# Patient Record
Sex: FEMALE | Race: WHITE | NOT HISPANIC OR LATINO | Employment: UNEMPLOYED | URBAN - NONMETROPOLITAN AREA
[De-identification: names, ages, dates, MRNs, and addresses within clinical notes are randomized per-mention and may not be internally consistent; named-entity substitution may affect disease eponyms.]

---

## 2017-06-09 ENCOUNTER — APPOINTMENT (OUTPATIENT)
Dept: GENERAL RADIOLOGY | Facility: HOSPITAL | Age: 77
End: 2017-06-09

## 2017-06-09 ENCOUNTER — HOSPITAL ENCOUNTER (EMERGENCY)
Facility: HOSPITAL | Age: 77
Discharge: HOME | End: 2017-06-09
Attending: EMERGENCY MEDICINE | Admitting: EMERGENCY MEDICINE

## 2017-06-09 ENCOUNTER — APPOINTMENT (OUTPATIENT)
Dept: CT IMAGING | Facility: HOSPITAL | Age: 77
End: 2017-06-09

## 2017-06-09 VITALS
OXYGEN SATURATION: 100 % | BODY MASS INDEX: 27.42 KG/M2 | WEIGHT: 149 LBS | DIASTOLIC BLOOD PRESSURE: 62 MMHG | HEART RATE: 62 BPM | TEMPERATURE: 97.8 F | HEIGHT: 62 IN | RESPIRATION RATE: 18 BRPM | SYSTOLIC BLOOD PRESSURE: 142 MMHG

## 2017-06-09 DIAGNOSIS — R55: ICD-10-CM
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
## 2017-06-09 DIAGNOSIS — N39.0 ACUTE UTI: ICD-10-CM

## 2017-06-09 LAB
ALBUMIN SERPL-MCNC: 3.8 G/DL (ref 3.4–4.8)
ALBUMIN/GLOB SERPL: 1.3 G/DL (ref 1.1–1.8)
ALP SERPL-CCNC: 77 U/L (ref 38–126)
ALT SERPL W P-5'-P-CCNC: 31 U/L (ref 9–52)
ANION GAP SERPL CALCULATED.3IONS-SCNC: 11 MMOL/L (ref 5–15)
AST SERPL-CCNC: 25 U/L (ref 14–36)
BACTERIA UR QL AUTO: ABNORMAL /HPF
BASOPHILS # BLD AUTO: 0.02 10*3/MM3 (ref 0–0.2)
BASOPHILS NFR BLD AUTO: 0.2 % (ref 0–2)
BILIRUB SERPL-MCNC: 0.4 MG/DL (ref 0.2–1.3)
BILIRUB UR QL STRIP: NEGATIVE
BUN BLD-MCNC: 21 MG/DL (ref 7–21)
BUN/CREAT SERPL: 21 (ref 7–25)
CALCIUM SPEC-SCNC: 8.8 MG/DL (ref 8.4–10.2)
CHLORIDE SERPL-SCNC: 106 MMOL/L (ref 95–110)
CLARITY UR: ABNORMAL
CO2 SERPL-SCNC: 23 MMOL/L (ref 22–31)
COLOR UR: YELLOW
CREAT BLD-MCNC: 1 MG/DL (ref 0.5–1)
DEPRECATED RDW RBC AUTO: 44.4 FL (ref 36.4–46.3)
EOSINOPHIL # BLD AUTO: 0.08 10*3/MM3 (ref 0–0.7)
EOSINOPHIL NFR BLD AUTO: 0.9 % (ref 0–7)
ERYTHROCYTE [DISTWIDTH] IN BLOOD BY AUTOMATED COUNT: 14.3 % (ref 11.5–14.5)
GFR SERPL CREATININE-BSD FRML MDRD: 53 ML/MIN/1.73 (ref 39–90)
GLOBULIN UR ELPH-MCNC: 2.9 GM/DL (ref 2.3–3.5)
GLUCOSE BLD-MCNC: 86 MG/DL (ref 60–100)
GLUCOSE BLDC GLUCOMTR-MCNC: 81 MG/DL (ref 70–130)
GLUCOSE UR STRIP-MCNC: NEGATIVE MG/DL
HCT VFR BLD AUTO: 36.5 % (ref 35–45)
HGB BLD-MCNC: 12.1 G/DL (ref 12–15.5)
HGB UR QL STRIP.AUTO: ABNORMAL
HOLD SPECIMEN: NORMAL
HOLD SPECIMEN: NORMAL
HYALINE CASTS UR QL AUTO: ABNORMAL /LPF
IMM GRANULOCYTES # BLD: 0.03 10*3/MM3 (ref 0–0.02)
IMM GRANULOCYTES NFR BLD: 0.3 % (ref 0–0.5)
KETONES UR QL STRIP: ABNORMAL
LEUKOCYTE ESTERASE UR QL STRIP.AUTO: ABNORMAL
LYMPHOCYTES # BLD AUTO: 2.86 10*3/MM3 (ref 0.6–4.2)
LYMPHOCYTES NFR BLD AUTO: 30.7 % (ref 10–50)
MAGNESIUM SERPL-MCNC: 2 MG/DL (ref 1.6–2.3)
MCH RBC QN AUTO: 28.4 PG (ref 26.5–34)
MCHC RBC AUTO-ENTMCNC: 33.2 G/DL (ref 31.4–36)
MCV RBC AUTO: 85.7 FL (ref 80–98)
MONOCYTES # BLD AUTO: 0.66 10*3/MM3 (ref 0–0.9)
MONOCYTES NFR BLD AUTO: 7.1 % (ref 0–12)
NEUTROPHILS # BLD AUTO: 5.66 10*3/MM3 (ref 2–8.6)
NEUTROPHILS NFR BLD AUTO: 60.8 % (ref 37–80)
NITRITE UR QL STRIP: POSITIVE
PH UR STRIP.AUTO: 6 [PH] (ref 5–9)
PLATELET # BLD AUTO: 312 10*3/MM3 (ref 150–450)
PMV BLD AUTO: 9.4 FL (ref 8–12)
POTASSIUM BLD-SCNC: 4.4 MMOL/L (ref 3.5–5.1)
PROT SERPL-MCNC: 6.7 G/DL (ref 6.3–8.6)
PROT UR QL STRIP: NEGATIVE
RBC # BLD AUTO: 4.26 10*6/MM3 (ref 3.77–5.16)
RBC # UR: ABNORMAL /HPF
REF LAB TEST METHOD: ABNORMAL
SODIUM BLD-SCNC: 140 MMOL/L (ref 137–145)
SP GR UR STRIP: 1.02 (ref 1–1.03)
SQUAMOUS #/AREA URNS HPF: ABNORMAL /HPF
TROPONIN I SERPL-MCNC: <0.012 NG/ML
UROBILINOGEN UR QL STRIP: ABNORMAL
WBC NRBC COR # BLD: 9.31 10*3/MM3 (ref 3.2–9.8)
WBC UR QL AUTO: ABNORMAL /HPF
WHOLE BLOOD HOLD SPECIMEN: NORMAL
WHOLE BLOOD HOLD SPECIMEN: NORMAL

## 2017-06-09 PROCEDURE — 70450 CT HEAD/BRAIN W/O DYE: CPT

## 2017-06-09 PROCEDURE — 87077 CULTURE AEROBIC IDENTIFY: CPT | Performed by: EMERGENCY MEDICINE

## 2017-06-09 PROCEDURE — 84484 ASSAY OF TROPONIN QUANT: CPT | Performed by: EMERGENCY MEDICINE

## 2017-06-09 PROCEDURE — 99284 EMERGENCY DEPT VISIT MOD MDM: CPT

## 2017-06-09 PROCEDURE — 73590 X-RAY EXAM OF LOWER LEG: CPT

## 2017-06-09 PROCEDURE — 83735 ASSAY OF MAGNESIUM: CPT | Performed by: EMERGENCY MEDICINE

## 2017-06-09 PROCEDURE — 93010 ELECTROCARDIOGRAM REPORT: CPT | Performed by: INTERNAL MEDICINE

## 2017-06-09 PROCEDURE — 87086 URINE CULTURE/COLONY COUNT: CPT | Performed by: EMERGENCY MEDICINE

## 2017-06-09 PROCEDURE — 82962 GLUCOSE BLOOD TEST: CPT

## 2017-06-09 PROCEDURE — 81001 URINALYSIS AUTO W/SCOPE: CPT | Performed by: EMERGENCY MEDICINE

## 2017-06-09 PROCEDURE — 93005 ELECTROCARDIOGRAM TRACING: CPT

## 2017-06-09 PROCEDURE — 25010000002 CEFTRIAXONE: Performed by: EMERGENCY MEDICINE

## 2017-06-09 PROCEDURE — 96365 THER/PROPH/DIAG IV INF INIT: CPT

## 2017-06-09 PROCEDURE — 73130 X-RAY EXAM OF HAND: CPT

## 2017-06-09 PROCEDURE — 80053 COMPREHEN METABOLIC PANEL: CPT | Performed by: EMERGENCY MEDICINE

## 2017-06-09 PROCEDURE — 85025 COMPLETE CBC W/AUTO DIFF WBC: CPT | Performed by: EMERGENCY MEDICINE

## 2017-06-09 PROCEDURE — 96361 HYDRATE IV INFUSION ADD-ON: CPT

## 2017-06-09 PROCEDURE — 71010 HC CHEST PA OR AP: CPT

## 2017-06-09 PROCEDURE — 87186 SC STD MICRODIL/AGAR DIL: CPT | Performed by: EMERGENCY MEDICINE

## 2017-06-09 RX ORDER — SODIUM CHLORIDE 0.9 % (FLUSH) 0.9 %
10 SYRINGE (ML) INJECTION AS NEEDED
Status: DISCONTINUED | OUTPATIENT
Start: 2017-06-09 | End: 2017-06-09 | Stop reason: HOSPADM

## 2017-06-09 RX ORDER — CEPHALEXIN 500 MG/1
500 CAPSULE ORAL 2 TIMES DAILY
Qty: 10 CAPSULE | Refills: 0 | Status: SHIPPED | OUTPATIENT
Start: 2017-06-09 | End: 2019-11-14

## 2017-06-09 RX ADMIN — CEFTRIAXONE 1 G: 1 INJECTION, POWDER, FOR SOLUTION INTRAMUSCULAR; INTRAVENOUS at 17:13

## 2017-06-09 RX ADMIN — SODIUM CHLORIDE 1000 ML: 900 INJECTION, SOLUTION INTRAVENOUS at 15:30

## 2017-06-09 NOTE — ED PROVIDER NOTES
Subjective   HPI Comments: 77 years old female with no active medical problems presented in the ER for evaluation of fall and transient syncopal episode.  Apparently she missed a step and fell on the sidewalk on her right hand, did not hit her head, loss consciousness for a few seconds and improved to normal.  She has no residual deficit.  No seizure-like activity.  She had no chest pain, palpitation, dizziness/lightheadedness or shortness of breath before or after the fall.  She is complaining of pain in the right hand and right shin.    Patient is a 77 y.o. female presenting with fall.   History provided by:  Patient  Fall   Mechanism of injury comment:  Missed a step because it was a bit higher than she thought and fell on the side walk.   Injury location:  Hand and finger  Hand injury location:  R palm  Finger injury location:  R ring finger and R long finger  Incident location:  [place]  Time since incident:  2 hours  Arrived directly from scene: yes    Suspicion of drug use: no    Tetanus status:  Up to date  Prior to arrival data:     Bystander interventions:  None    Patient ambulatory at scene: yes      Blood loss:  None    Responsiveness at scene:  Alert    Orientation at scene:  Person, place, situation and time    Amnesic to event: no      Airway interventions:  None    Breathing interventions:  None  Associated symptoms: loss of consciousness    Associated symptoms: no abdominal pain, no back pain, no blindness, no chest pain, no difficulty breathing, no headaches, no hearing loss, no nausea, no neck pain, no seizures and no vomiting        Review of Systems   Constitutional: Negative for chills and fever.   HENT: Negative for congestion, facial swelling, hearing loss, rhinorrhea and sinus pressure.    Eyes: Negative for blindness, photophobia and visual disturbance.   Respiratory: Negative for cough, chest tightness, shortness of breath and wheezing.    Cardiovascular: Negative for chest pain.    Gastrointestinal: Negative for abdominal pain, diarrhea, nausea and vomiting.   Endocrine: Negative for polyuria.   Genitourinary: Negative for flank pain.   Musculoskeletal: Negative for back pain, joint swelling and neck pain.   Skin: Negative for rash.   Neurological: Positive for loss of consciousness. Negative for seizures, numbness and headaches.   Hematological: Negative for adenopathy.   Psychiatric/Behavioral: Negative for agitation.       Past Medical History:   Diagnosis Date   • Memory loss, short term        No Known Allergies    Past Surgical History:   Procedure Laterality Date   • BREAST BIOPSY     • HYSTERECTOMY         History reviewed. No pertinent family history.    Social History     Social History   • Marital status:      Spouse name: N/A   • Number of children: N/A   • Years of education: N/A     Social History Main Topics   • Smoking status: Never Smoker   • Smokeless tobacco: None   • Alcohol use No   • Drug use: No   • Sexual activity: Not Asked     Other Topics Concern   • None     Social History Narrative   • None           Objective   Physical Exam   Constitutional: She is oriented to person, place, and time. She appears well-developed and well-nourished. No distress.   HENT:   Head: Normocephalic and atraumatic.   Nose: Nose normal.   Mouth/Throat: Oropharynx is clear and moist.   Eyes: Conjunctivae and EOM are normal. Pupils are equal, round, and reactive to light.   Neck: Normal range of motion. Neck supple. No tracheal deviation present. No thyromegaly present.   Cardiovascular: Normal rate, regular rhythm and normal heart sounds.    Pulmonary/Chest: Effort normal and breath sounds normal. No respiratory distress. She has no wheezes.   Abdominal: Soft. Bowel sounds are normal. She exhibits no distension. There is no tenderness. There is no guarding.   Musculoskeletal: She exhibits tenderness (Abrasion on the right shin.). She exhibits no edema or deformity.   Tenderness in  the right carpal/metacarpal bones with decreased range of motion of the middle or ring finger Metacarpophalangeal joint.  No swelling.  Distal neurovascular well intact.   Neurological: She is alert and oriented to person, place, and time. She has normal strength. She displays no tremor and normal reflexes. No cranial nerve deficit. She exhibits normal muscle tone. She displays no seizure activity. Coordination normal. GCS eye subscore is 4. GCS verbal subscore is 5. GCS motor subscore is 6. She displays no Babinski's sign on the right side. She displays no Babinski's sign on the left side.   Skin: Skin is warm and dry. No rash noted.   Psychiatric: She has a normal mood and affect.   Nursing note and vitals reviewed.      ECG 12 Lead    Date/Time: 6/9/2017 2:34 PM  Performed by: MALDONADO DELATORRE  Authorized by: MALDONADO DELATORRE   Interpreted by physician  Rhythm: sinus rhythm  Rate: normal  BPM: 60  QRS axis: normal  Conduction: conduction normal  ST Segments: ST segments normal  T Waves: T waves normal  Other: no other findings  Clinical impression: normal ECG                 ED Course  ED Course   Comment By Time   Patient is evaluated for fall and cause a syncopal episode.  She has negative CT head for any midline shift/mass effect or bleed.  Negative chemistry profile including troponins.  EKG is normal sinus rhythm.  There is no acute finding on the chest x-ray.  X-ray of the hand and leg did not show any fracture dislocation.  She is up-to-date with tetanus.  She is ambulating in the ER without any difficulty.  I do not believe she needs any further workup and can be discharged.  Her tonsils syncopal episode could be secondary to pain and fall. Maldonado Delatorre MD 06/09 1842   She has urinary tract infection and is given first dose of antibiotic in the ER.  Advise follow-up with primary care in 2-3 days. Maldonado Delatorre MD 06/09 6608          Labs Reviewed   URINE CULTURE - Abnormal; Notable for the following:        Result  Value    Urine Culture >100,000 CFU/mL Klebsiella pneumoniae (*)     All other components within normal limits   CBC WITH AUTO DIFFERENTIAL - Abnormal; Notable for the following:     Immature Grans, Absolute 0.03 (*)     All other components within normal limits   URINALYSIS W/ CULTURE IF INDICATED - Abnormal; Notable for the following:     Appearance, UA Turbid (*)     Ketones, UA 15 mg/dL (1+) (*)     Blood, UA Trace (*)     Leuk Esterase, UA Trace (*)     Nitrite, UA Positive (*)     All other components within normal limits   URINALYSIS, MICROSCOPIC ONLY - Abnormal; Notable for the following:     RBC, UA 0-2 (*)     WBC, UA 6-12 (*)     Bacteria, UA 4+ (*)     All other components within normal limits   COMPREHENSIVE METABOLIC PANEL - Normal    Narrative:     The MDRD GFR formula is only valid for adults with stable renal function between ages 18 and 70.   MAGNESIUM - Normal   TROPONIN (IN-HOUSE) - Normal   POCT GLUCOSE FINGERSTICK - Normal   RAINBOW DRAW    Narrative:     The following orders were created for panel order [facility] Draw.  Procedure                               Abnormality         Status                     ---------                               -----------         ------                     Light Blue Top[[id number]]                                   Final result               Green Top (Gel)[[id number]]                                  Final result               Lavender Top[[id number]]                                     Final result               Gold Top - SST[[id number]]                                   Final result                 Please view results for these tests on the individual orders.   CBC AND DIFFERENTIAL    Narrative:     The following orders were created for panel order CBC & Differential.  Procedure                               Abnormality         Status                     ---------                               -----------         ------                     CBC Auto  Differential[[id number]]        Abnormal            Final result                 Please view results for these tests on the individual orders.   LIGHT BLUE TOP   GREEN TOP   LAVENDER TOP   GOLD TOP - SST       Xr Hand 3+ View Right    Result Date: 6/9/2017  Narrative: DATE OF EXAM: 6/9/2017 2:35 PM CDT PROCEDURE: XR HAND 3 OR MORE VIEWS INDICATION FOR PROCEDURE: 77 years old patient presents for evaluation of right-sided hand pain and swelling after fall.. TECHNIQUE:  Three views of the right hand are submitted for interpretation. COMPARISON:  No previous studies of the right hand are available for comparison at the time of dictation. FINDINGS:  There is chondrocalcinosis of the triangular cartilage complex.  The distal radius and ulna have a grossly normal appearance. There are small erosive changes at the carpal bones suggesting possible sequela of inflammatory arthropathy. The metacarpals have grossly normal appearance. There is narrowing of the IP joints. The phalanges have normal appearance and alignment otherwise. There is mild soft tissue swelling of the wrist.     Impression: CONCLUSION:  1.  Chondrocalcinosis of the wrist. 2.  Possible sequela of erosive arthropathy at the carpal bones. 3.  Degenerative changes of the phalanges. Electronically signed by:  Iqra Jay MD  6/9/2017 2:57 PM CDT Workstation: [id number]    Xr Tibia Fibula 2 View Right    Result Date: 6/9/2017  Narrative: PROCEDURE: AP and lateral right tibia and fibula COMPARISON: No comparison. HISTORY: Injury, pain FINDINGS: No acute fracture or subluxation involving of the right tibia or fibula. There are soft tissue calcifications in the regions of the medial and lateral menisci suspicious for calcium pyrophosphate deposition arthropathy.     Impression: CONCLUSION:  No radiographic evidence of acute fracture. Soft tissue calcifications in the regions of the medial and lateral menisci are suspicious for calcium pyrophosphate deposition  arthropathy. Electronically signed by:  Satya Flowers MD  6/9/2017 3:20 PM CDT Workstation: [id number]    Ct Head Without Contrast    Result Date: 6/9/2017  Narrative: DATE OF PROCEDURE:  6/9/2017 3:03 PM CDT CT OF THE HEAD WITHOUT IV CONTRAST INDICATION FOR PROCEDURE: A  77 years-old patient presents for evaluation of syncope with fall.. TECHNIQUE: Contiguous axial images are obtained of the head. No intravenous contrast is administered.  Multiplanar reformations are submitted for interpretation.  Dose length product is 908.2. Images were acquired in accordance with the principles of ALARA (as low as reasonably allowable). COMPARISON: None. FINDINGS: There is mild prominence of the cortical sulci probably secondary to involutional changes related to aging.  There are patchy areas of decreased attenuation within the white matter, likely the sequela of microvascular disease.  There is normal gray-white differentiation. There is no CT evidence for mass or mass effect. There are no abnormal intra-axial or extra-axial fluid collections. The ventricles have normal size and position. The basal ganglia, thalami, brainstem and cerebellum have a normal appearance. The scalp has a normal appearance. The orbits have a normal unenhanced appearance. Imaged paranasal sinuses and mastoid air cells are clear.  There is no obvious depressed or linear skull fracture. No acute infarcts are visible, however, these maybe radiographically occult.  There is mild patchy atherosclerotic calcification of the intracranial arteries.     Impression: CONCLUSION:   1.  No CT evidence of acute intracranial hemorrhage. 2.  Mild involutional changes. Electronically signed by:  Iqra Jay MD  6/9/2017 3:19 PM CDT Workstation: [email or web address]    Xr Chest 1 View    Result Date: 6/9/2017  Narrative: PROCEDURE: XR CHEST 1 VIEW INDICATION FOR PROCEDURE:  77 years -old patient presents for evaluation of recent syncope and fall. COMPARISON:  None FINDINGS:  XR CHEST 1 view  reveals the lungs are underexpanded. Cardiac monitoring leads are present.  There is no radiographic evidence for airspace consolidation, pleural effusion or pneumothorax. Mediastinal and cardiac silhouettes are within normal limits. There is a calcified nodule the left lung base measuring approximately 3.4 mm. This may represent small granuloma. Thoracic aorta is tortuous. The skeletal structures are within normal limits.     Impression: No radiographic evidence of acute cardiopulmonary disease. Electronically signed by:  Iqra Jay MD  6/9/2017 2:54 PM CDT [facility]: [facility]    Final diagnoses:   Fall, initial encounter   Acute UTI   Non-cardiac syncope            Roby Pineda MD  06/11/17 0733

## 2017-06-09 NOTE — ED TRIAGE NOTES
Patient presents to the ED after a fall from a step. Patient's family states that it looks like she passed out prior to falling patient does not remember this incident.

## 2017-06-09 NOTE — DISCHARGE INSTRUCTIONS
Follow-up with primary care physician in 2-3 days for reevaluation.  Stay with a responsible person for next 24-48 hours with frequent neuro checks.  Return to ER for worsening.

## 2017-06-11 LAB
BACTERIA SPEC AEROBE CULT: ABNORMAL
BETA LACTAMASE: ABNORMAL

## 2019-06-10 ENCOUNTER — TRANSCRIBE ORDERS (OUTPATIENT)
Dept: PODIATRY | Facility: CLINIC | Age: 79
End: 2019-06-10

## 2019-06-10 DIAGNOSIS — B35.1 ONYCHOMYCOSIS: Primary | ICD-10-CM

## 2019-06-10 DIAGNOSIS — M21.619 BUNION OF GREAT TOE: ICD-10-CM

## 2019-07-01 ENCOUNTER — OFFICE VISIT (OUTPATIENT)
Dept: PODIATRY | Facility: CLINIC | Age: 79
End: 2019-07-01

## 2019-07-01 VITALS — WEIGHT: 137 LBS | HEART RATE: 62 BPM | BODY MASS INDEX: 25.21 KG/M2 | OXYGEN SATURATION: 95 % | HEIGHT: 62 IN

## 2019-07-01 DIAGNOSIS — M79.674 CHRONIC PAIN OF TOE OF RIGHT FOOT: ICD-10-CM

## 2019-07-01 DIAGNOSIS — B35.1 ONYCHOMYCOSIS: Primary | ICD-10-CM

## 2019-07-01 DIAGNOSIS — G89.29 CHRONIC PAIN OF TOE OF LEFT FOOT: ICD-10-CM

## 2019-07-01 DIAGNOSIS — G89.29 CHRONIC PAIN OF TOE OF RIGHT FOOT: ICD-10-CM

## 2019-07-01 DIAGNOSIS — M79.675 CHRONIC PAIN OF TOE OF LEFT FOOT: ICD-10-CM

## 2019-07-01 PROCEDURE — 11721 DEBRIDE NAIL 6 OR MORE: CPT | Performed by: PODIATRIST

## 2019-07-01 PROCEDURE — 99203 OFFICE O/P NEW LOW 30 MIN: CPT | Performed by: PODIATRIST

## 2019-07-01 RX ORDER — DONEPEZIL HYDROCHLORIDE 10 MG/1
5 TABLET, FILM COATED ORAL NIGHTLY
COMMUNITY
Start: 2019-07-01

## 2019-07-01 RX ORDER — MEMANTINE HYDROCHLORIDE 10 MG/1
TABLET ORAL
COMMUNITY
Start: 2019-07-01

## 2019-07-01 NOTE — PROGRESS NOTES
Salome Drake  1940  79 y.o. female     Patient presents today with a complaint of painful toenails.    07/01/2019    Chief Complaint   Patient presents with   • Left Foot - nail care   • Right Foot - nail care       History of Present Illness    Salome Drake is a 79 y.o.female who presents to clinic today with chief complaint of toe pain.  Pain is located to her toenails.  Patient states that the toenails are long, thick and painful.  Her pain is aggravated with weightbearing and close toed shoes.  Toenail pain is relieved with debridement.  However patient is no longer able to trim her own toenails due to the thickness.  She denies any injuries or trauma to her feet.  She has no other pedal complaints.      Past Medical History:   Diagnosis Date   • Bunion    • Dementia    • Memory loss, short term    • Onychomycosis          Past Surgical History:   Procedure Laterality Date   • BLADDER AUGMENTATION     • BREAST BIOPSY     • HYSTERECTOMY           Family History   Problem Relation Age of Onset   • Cancer Father    • Cancer Brother    • Heart disease Brother        No Known Allergies    Social History     Socioeconomic History   • Marital status:      Spouse name: Not on file   • Number of children: Not on file   • Years of education: Not on file   • Highest education level: Not on file   Tobacco Use   • Smoking status: Never Smoker   • Smokeless tobacco: Never Used   Substance and Sexual Activity   • Alcohol use: No   • Drug use: No   • Sexual activity: No         Current Outpatient Medications   Medication Sig Dispense Refill   • cephalexin (KEFLEX) 500 MG capsule Take 1 capsule by mouth 2 (Two) Times a Day. 10 capsule 0   • donepezil (ARICEPT) 10 MG tablet      • memantine (NAMENDA) 10 MG tablet        No current facility-administered medications for this visit.        Review of Systems   Constitutional: Negative.    HENT: Negative.    Eyes: Negative.    Respiratory: Negative.   "  Cardiovascular: Negative.    Gastrointestinal: Negative.    Genitourinary: Negative.    Musculoskeletal:        Toenail discomfort   Skin: Negative.    Neurological: Positive for dizziness.   Psychiatric/Behavioral: Positive for confusion.         OBJECTIVE    Pulse 62   Ht 157.5 cm (62\")   Wt 62.1 kg (137 lb)   SpO2 95%   BMI 25.06 kg/m²       Physical Exam   Constitutional: She is oriented to person, place, and time. She appears well-developed and well-nourished. No distress.   HENT:   Head: Normocephalic and atraumatic.   Nose: Nose normal.   Eyes: Conjunctivae and EOM are normal. Pupils are equal, round, and reactive to light.   Pulmonary/Chest: Effort normal. No respiratory distress. She has no wheezes.   Neurological: She is alert and oriented to person, place, and time. She displays normal reflexes.   Skin: Skin is warm and dry. Capillary refill takes less than 2 seconds.   Vitals reviewed.      Lower Extremity    Cardiovascular:    DP/PT pulses palpable bilateral  CFT brisk  to all digits  Skin temp is warm to warm from proximal tibia to distal digits bilateral  Pedal hair growth present.   Mild edema noted to bilateral lower extremities.  Diffuse varicosities noted to bilateral lower extremities.    Musculoskeletal:  Muscle strength is 5/5 for all muscle groups tested   ROM of the 1st MTP is WNL bilateral   ROM of the MTJ is WNL bilateral   ROM of the STJ is WNL bilateral   ROM of the ankle joint is  WNL bilateral     Dermatological:   Skin is warm, dry and intact bilateral  Webspaces 1-4 bilateral are clean, dry and intact.   No subcutaneous nodules or masses noted    Nails 1-5 bilateral are thickened, discolored, elongated with subungual debris.  Pain on palpation to the nail plates.    Neurological:   Protective sensation intact   Sensation intact to light touch        Procedures        ASSESSMENT AND PLAN    Salome was seen today for nail care and nail care.    Diagnoses and all orders for this " visit:    Onychomycosis    Chronic pain of toe of left foot    Chronic pain of toe of right foot      - Comprehensive foot and ankle exam performed.   - Diagnosis and treatment of fungal toenails was discussed with the patient including nail biopsy and treatment with oral antifungals versus avulsions both temporary and permanent versus regular debridements.   -Patient elected for routine nail debridement.  - Nails 1-5 bilateral were debrided in length and thickness with nail nipper and electric  to decrease fungal load and risk of infection.   - All questions were answered to the patients satisfaction.  - RTC as needed           This document has been electronically signed by Jayson Rodriguez DPM on July 1, 2019 1:39 PM     7/1/2019  1:39 PM

## 2019-11-14 ENCOUNTER — HOSPITAL ENCOUNTER (OUTPATIENT)
Facility: HOSPITAL | Age: 79
Setting detail: OBSERVATION
Discharge: HOME | End: 2019-11-17
Attending: FAMILY MEDICINE | Admitting: INTERNAL MEDICINE

## 2019-11-14 DIAGNOSIS — G30.8 ALZHEIMER'S DISEASE OF OTHER ONSET WITHOUT BEHAVIORAL DISTURBANCE: ICD-10-CM

## 2019-11-14 DIAGNOSIS — R53.1 WEAKNESS: Primary | ICD-10-CM

## 2019-11-14 DIAGNOSIS — Z74.09 IMPAIRED MOBILITY AND ACTIVITIES OF DAILY LIVING: ICD-10-CM

## 2019-11-14 DIAGNOSIS — F02.80 ALZHEIMER'S DISEASE OF OTHER ONSET WITHOUT BEHAVIORAL DISTURBANCE: ICD-10-CM

## 2019-11-14 DIAGNOSIS — N39.0 URINARY TRACT INFECTION WITHOUT HEMATURIA, SITE UNSPECIFIED: ICD-10-CM

## 2019-11-14 DIAGNOSIS — Z78.9 IMPAIRED MOBILITY AND ACTIVITIES OF DAILY LIVING: ICD-10-CM

## 2019-11-14 PROBLEM — F03.90 DEMENTIA (HCC): Chronic | Status: ACTIVE | Noted: 2019-11-14

## 2019-11-14 LAB
ALBUMIN SERPL-MCNC: 4 G/DL (ref 3.5–5.2)
ALBUMIN/GLOB SERPL: 1.4 G/DL
ALP SERPL-CCNC: 93 U/L (ref 39–117)
ALT SERPL W P-5'-P-CCNC: 8 U/L (ref 1–33)
ANION GAP SERPL CALCULATED.3IONS-SCNC: 12 MMOL/L (ref 5–15)
AST SERPL-CCNC: 21 U/L (ref 1–32)
BACTERIA UR QL AUTO: ABNORMAL /HPF
BASOPHILS # BLD AUTO: 0.03 10*3/MM3 (ref 0–0.2)
BASOPHILS NFR BLD AUTO: 0.3 % (ref 0–1.5)
BILIRUB SERPL-MCNC: <0.2 MG/DL (ref 0.2–1.2)
BILIRUB UR QL STRIP: NEGATIVE
BUN BLD-MCNC: 19 MG/DL (ref 8–23)
BUN/CREAT SERPL: 23.5 (ref 7–25)
CALCIUM SPEC-SCNC: 9.4 MG/DL (ref 8.6–10.5)
CHLORIDE SERPL-SCNC: 100 MMOL/L (ref 98–107)
CLARITY UR: CLEAR
CO2 SERPL-SCNC: 27 MMOL/L (ref 22–29)
COLOR UR: YELLOW
CREAT BLD-MCNC: 0.81 MG/DL (ref 0.57–1)
DEPRECATED RDW RBC AUTO: 43 FL (ref 37–54)
EOSINOPHIL # BLD AUTO: 0.15 10*3/MM3 (ref 0–0.4)
EOSINOPHIL NFR BLD AUTO: 1.7 % (ref 0.3–6.2)
ERYTHROCYTE [DISTWIDTH] IN BLOOD BY AUTOMATED COUNT: 13.8 % (ref 12.3–15.4)
GFR SERPL CREATININE-BSD FRML MDRD: 68 ML/MIN/1.73
GLOBULIN UR ELPH-MCNC: 2.8 GM/DL
GLUCOSE BLD-MCNC: 91 MG/DL (ref 65–99)
GLUCOSE UR STRIP-MCNC: NEGATIVE MG/DL
HCT VFR BLD AUTO: 36.6 % (ref 34–46.6)
HGB BLD-MCNC: 12 G/DL (ref 12–15.9)
HGB UR QL STRIP.AUTO: NEGATIVE
HOLD SPECIMEN: NORMAL
HOLD SPECIMEN: NORMAL
HYALINE CASTS UR QL AUTO: ABNORMAL /LPF
IMM GRANULOCYTES # BLD AUTO: 0.03 10*3/MM3 (ref 0–0.05)
IMM GRANULOCYTES NFR BLD AUTO: 0.3 % (ref 0–0.5)
KETONES UR QL STRIP: NEGATIVE
LEUKOCYTE ESTERASE UR QL STRIP.AUTO: ABNORMAL
LYMPHOCYTES # BLD AUTO: 3.37 10*3/MM3 (ref 0.7–3.1)
LYMPHOCYTES NFR BLD AUTO: 37.1 % (ref 19.6–45.3)
MAGNESIUM SERPL-MCNC: 2 MG/DL (ref 1.6–2.4)
MCH RBC QN AUTO: 28 PG (ref 26.6–33)
MCHC RBC AUTO-ENTMCNC: 32.8 G/DL (ref 31.5–35.7)
MCV RBC AUTO: 85.3 FL (ref 79–97)
MONOCYTES # BLD AUTO: 0.64 10*3/MM3 (ref 0.1–0.9)
MONOCYTES NFR BLD AUTO: 7 % (ref 5–12)
NEUTROPHILS # BLD AUTO: 4.87 10*3/MM3 (ref 1.7–7)
NEUTROPHILS NFR BLD AUTO: 53.6 % (ref 42.7–76)
NITRITE UR QL STRIP: NEGATIVE
NRBC BLD AUTO-RTO: 0 /100 WBC (ref 0–0.2)
PH UR STRIP.AUTO: 6.5 [PH] (ref 5–9)
PLATELET # BLD AUTO: 399 10*3/MM3 (ref 140–450)
PMV BLD AUTO: 9.1 FL (ref 6–12)
POTASSIUM BLD-SCNC: 4 MMOL/L (ref 3.5–5.2)
PROT SERPL-MCNC: 6.8 G/DL (ref 6–8.5)
PROT UR QL STRIP: NEGATIVE
RBC # BLD AUTO: 4.29 10*6/MM3 (ref 3.77–5.28)
RBC # UR: ABNORMAL /HPF
REF LAB TEST METHOD: ABNORMAL
SODIUM BLD-SCNC: 139 MMOL/L (ref 136–145)
SP GR UR STRIP: 1.03 (ref 1–1.03)
SQUAMOUS #/AREA URNS HPF: ABNORMAL /HPF
T4 FREE SERPL-MCNC: 1.04 NG/DL (ref 0.93–1.7)
TSH SERPL DL<=0.05 MIU/L-ACNC: 5.66 UIU/ML (ref 0.27–4.2)
UROBILINOGEN UR QL STRIP: ABNORMAL
WBC NRBC COR # BLD: 9.09 10*3/MM3 (ref 3.4–10.8)
WBC UR QL AUTO: ABNORMAL /HPF
WHOLE BLOOD HOLD SPECIMEN: NORMAL
WHOLE BLOOD HOLD SPECIMEN: NORMAL

## 2019-11-14 PROCEDURE — 96365 THER/PROPH/DIAG IV INF INIT: CPT

## 2019-11-14 PROCEDURE — G0378 HOSPITAL OBSERVATION PER HR: HCPCS

## 2019-11-14 PROCEDURE — 80053 COMPREHEN METABOLIC PANEL: CPT | Performed by: FAMILY MEDICINE

## 2019-11-14 PROCEDURE — 87086 URINE CULTURE/COLONY COUNT: CPT | Performed by: NURSE PRACTITIONER

## 2019-11-14 PROCEDURE — 83735 ASSAY OF MAGNESIUM: CPT | Performed by: FAMILY MEDICINE

## 2019-11-14 PROCEDURE — 84439 ASSAY OF FREE THYROXINE: CPT | Performed by: FAMILY MEDICINE

## 2019-11-14 PROCEDURE — 99284 EMERGENCY DEPT VISIT MOD MDM: CPT

## 2019-11-14 PROCEDURE — 25010000002 CEFTRIAXONE PER 250 MG: Performed by: FAMILY MEDICINE

## 2019-11-14 PROCEDURE — 85025 COMPLETE CBC W/AUTO DIFF WBC: CPT

## 2019-11-14 PROCEDURE — 84443 ASSAY THYROID STIM HORMONE: CPT | Performed by: FAMILY MEDICINE

## 2019-11-14 PROCEDURE — 81001 URINALYSIS AUTO W/SCOPE: CPT | Performed by: FAMILY MEDICINE

## 2019-11-14 PROCEDURE — 87076 CULTURE ANAEROBE IDENT EACH: CPT | Performed by: NURSE PRACTITIONER

## 2019-11-14 RX ORDER — RISPERIDONE 0.25 MG/1
0.25 TABLET ORAL 2 TIMES DAILY
COMMUNITY

## 2019-11-14 RX ORDER — SODIUM CHLORIDE 0.9 % (FLUSH) 0.9 %
10 SYRINGE (ML) INJECTION EVERY 12 HOURS SCHEDULED
Status: DISCONTINUED | OUTPATIENT
Start: 2019-11-14 | End: 2019-11-17 | Stop reason: HOSPADM

## 2019-11-14 RX ORDER — MEMANTINE HYDROCHLORIDE 5 MG/1
10 TABLET ORAL DAILY
Status: DISCONTINUED | OUTPATIENT
Start: 2019-11-15 | End: 2019-11-17 | Stop reason: HOSPADM

## 2019-11-14 RX ORDER — SODIUM CHLORIDE 0.9 % (FLUSH) 0.9 %
10 SYRINGE (ML) INJECTION AS NEEDED
Status: DISCONTINUED | OUTPATIENT
Start: 2019-11-14 | End: 2019-11-17 | Stop reason: HOSPADM

## 2019-11-14 RX ORDER — SODIUM CHLORIDE 9 MG/ML
50 INJECTION, SOLUTION INTRAVENOUS CONTINUOUS
Status: DISCONTINUED | OUTPATIENT
Start: 2019-11-14 | End: 2019-11-15

## 2019-11-14 RX ORDER — DONEPEZIL HYDROCHLORIDE 5 MG/1
5 TABLET, FILM COATED ORAL NIGHTLY
Status: DISCONTINUED | OUTPATIENT
Start: 2019-11-14 | End: 2019-11-17 | Stop reason: HOSPADM

## 2019-11-14 RX ORDER — RISPERIDONE 0.25 MG/1
0.25 TABLET ORAL 2 TIMES DAILY
Status: DISCONTINUED | OUTPATIENT
Start: 2019-11-14 | End: 2019-11-17 | Stop reason: HOSPADM

## 2019-11-14 RX ADMIN — CEFTRIAXONE SODIUM 1 G: 1 INJECTION, POWDER, FOR SOLUTION INTRAMUSCULAR; INTRAVENOUS at 21:55

## 2019-11-14 RX ADMIN — DONEPEZIL HYDROCHLORIDE 5 MG: 5 TABLET, FILM COATED ORAL at 23:48

## 2019-11-14 RX ADMIN — SODIUM CHLORIDE 50 ML/HR: 9 INJECTION, SOLUTION INTRAVENOUS at 23:19

## 2019-11-14 RX ADMIN — RISPERIDONE 0.25 MG: 0.25 TABLET ORAL at 23:48

## 2019-11-15 PROCEDURE — G0378 HOSPITAL OBSERVATION PER HR: HCPCS

## 2019-11-15 PROCEDURE — 25010000002 CEFTRIAXONE PER 250 MG: Performed by: INTERNAL MEDICINE

## 2019-11-15 PROCEDURE — 96366 THER/PROPH/DIAG IV INF ADDON: CPT

## 2019-11-15 PROCEDURE — 97162 PT EVAL MOD COMPLEX 30 MIN: CPT | Performed by: PHYSICAL THERAPIST

## 2019-11-15 RX ADMIN — RISPERIDONE 0.25 MG: 0.25 TABLET ORAL at 20:22

## 2019-11-15 RX ADMIN — CEFTRIAXONE SODIUM 1 G: 1 INJECTION, POWDER, FOR SOLUTION INTRAMUSCULAR; INTRAVENOUS at 20:22

## 2019-11-15 RX ADMIN — DONEPEZIL HYDROCHLORIDE 5 MG: 5 TABLET, FILM COATED ORAL at 20:22

## 2019-11-15 RX ADMIN — MEMANTINE 10 MG: 5 TABLET ORAL at 08:55

## 2019-11-15 RX ADMIN — SODIUM CHLORIDE, PRESERVATIVE FREE 10 ML: 5 INJECTION INTRAVENOUS at 20:22

## 2019-11-15 RX ADMIN — RISPERIDONE 0.25 MG: 0.25 TABLET ORAL at 08:55

## 2019-11-15 NOTE — SIGNIFICANT NOTE
Meeting held with son/POA, Erwin Ramos RN-Ekaterina,  Jayne Peña and Tania Umanzor.  Son wishes for referral to be sent to [facility] for SNF placement and also wishes for DNR code status.  Copies of POA forms obtained by RN and to be scanned into Epic.

## 2019-11-15 NOTE — PLAN OF CARE
Problem: Patient Care Overview  Goal: Plan of Care Review  Outcome: Ongoing (interventions implemented as appropriate)   11/15/19 4743   Coping/Psychosocial   Plan of Care Reviewed With caregiver;patient;family   Plan of Care Review   Progress no change   OTHER   Outcome Summary New assessment: pt admitted with weakness and poor appetite with wt loss. She has been dxed with a UTI. Will add supplements and monitor.

## 2019-11-15 NOTE — PROGRESS NOTES
[facility] Medicine Services  INPATIENT PROGRESS NOTE    Length of Stay: 0  Date of Admission: 11/14/2019  Primary Care Physician: Addy Canales MD    Subjective   Chief Complaint: weakness, confusion  HPI:  79 year old  female with past medical history of dementia, OA who presented on 11/14/19 with complaints of worsening confusion, weakness, anorexia per family report.  She is found to have UTI and is admitted for treatment of UTI and possible SNF placement due to worsening dementia.  During today's visit, the patient is pleasantly confused and denies complaints.     Review of Systems   Unable to perform ROS: Dementia        All pertinent negatives and positives are as above. All other systems have been reviewed and are negative unless otherwise stated.     Objective    Temp:  [96.5 °F (35.8 °C)-97.7 °F (36.5 °C)] 96.5 °F (35.8 °C)  Heart Rate:  [59-78] 78  Resp:  [18] 18  BP: (134-163)/(60-80) 142/72    Physical Exam   Constitutional: She appears well-developed and well-nourished. No distress.   HENT:   Head: Normocephalic and atraumatic.   Right Ear: External ear normal.   Left Ear: External ear normal.   Nose: Nose normal.   Eyes: Conjunctivae are normal. Right eye exhibits no discharge. Left eye exhibits no discharge.   Neck: Normal range of motion. Neck supple. No JVD present.   Cardiovascular: Normal rate, regular rhythm, normal heart sounds and intact distal pulses. Exam reveals no gallop and no friction rub.   No murmur heard.  Pulmonary/Chest: Effort normal and breath sounds normal. No stridor. No respiratory distress. She has no wheezes. She has no rales.   Abdominal: Soft. Bowel sounds are normal. She exhibits no distension. There is no tenderness.   Musculoskeletal: Normal range of motion. She exhibits no edema.   Neurological: She is alert.   Oriented to person only   Skin: Skin is warm and dry. She is not diaphoretic.   Psychiatric: She has a  normal mood and affect. Her behavior is normal.   Nursing note and vitals reviewed.          Results Review:  I have reviewed the labs, radiology results, and diagnostic studies.    Laboratory Data:   Results from last 7 days   Lab Units 11/14/19 2000   SODIUM mmol/L 139   POTASSIUM mmol/L 4.0   CHLORIDE mmol/L 100   CO2 mmol/L 27.0   BUN mg/dL 19   CREATININE mg/dL 0.81   GLUCOSE mg/dL 91   CALCIUM mg/dL 9.4   BILIRUBIN mg/dL <0.2*   ALK PHOS U/L 93   ALT (SGPT) U/L 8   AST (SGOT) U/L 21   ANION GAP mmol/L 12.0     Estimated Creatinine Clearance: 47.2 mL/min (by C-G formula based on SCr of 0.81 mg/dL).  Results from last 7 days   Lab Units 11/14/19 2000   MAGNESIUM mg/dL 2.0         Results from last 7 days   Lab Units 11/14/19 2000   WBC 10*3/mm3 9.09   HEMOGLOBIN g/dL 12.0   HEMATOCRIT % 36.6   PLATELETS 10*3/mm3 399           Culture Data:   No results found for: BLOODCX  No results found for: URINECX  No results found for: RESPCX  No results found for: WOUNDCX  No results found for: STOOLCX  No components found for: BODYFLD    Radiology Data:   Imaging Results (Last 24 Hours)     ** No results found for the last 24 hours. **          I have reviewed the patient's current medications.     Assessment/Plan     Active Hospital Problems    Diagnosis   • **Dementia (CMS/[facility])   • Generalized weakness   • Acute UTI (urinary tract infection)       Plan:    1. Acute cystitis: continue Rocephin.  Awaiting urine culture.  2. Dementia: continue Aricept, Namenda, Risperdal.  3. Generalized weakness/deconditioning: Awaiting PT/OT consults.        Discharge Planning: daughters wish against SNF placement but I am told POA/son wishes to pursue placement.  Son to bring in POA forms and discuss discharge planning later.          This document has been electronically signed by VIJI Mosqueda on November 15, 2019 1:13 PM

## 2019-11-15 NOTE — DISCHARGE PLACEMENT REQUEST
"Referral for placement.   Therapy ordered for eval today 11/15/19  Jayne Peña RN,   [phone] cell                 Naomie Braxton (79 y.o. Female)     Date of Birth Social Security Number Address Home Phone MRN    [id number]  [id number] [place] [address] [phone] [id number]    Bahai Marital Status          Sikhism        Admission Date Admission Type Admitting Provider Attending Provider Department, Room/Bed    11/14/19 Emergency Hieu Jefferson MD Popescu, Tudor, MD [address], 380/1    Discharge Date Discharge Disposition Discharge Destination                       Attending Provider:  Hieu Jefferson MD    Allergies:  No Known Allergies    Isolation:  None   Infection:  None   Code Status:  CPR    Ht:  157.5 cm (62\")   Wt:  62.1 kg (137 lb)    Admission Cmt:  None   Principal Problem:  Dementia (CMS/[facility]) [F03.90]                 Active Insurance as of 11/14/2019     Primary Coverage     Payor Plan Insurance Group Employer/Plan Group    HUMANA MEDICARE REPLACEMENT HUMANA MEDICARE REPL [id number]     Payor Plan Address Payor Plan Phone Number Payor Plan Fax Number Effective Dates    [address] [address] [phone]  1/1/2018 - None Entered    Formerly [facility] [id number]       Subscriber Name Subscriber Birth Date Member ID       NAOMIE BRAXTON 1940 [id number]                 Emergency Contacts      (Rel.) Home Phone Work Phone Mobile Phone    Addy Braxton (Spouse) [phone] -- [phone]    KIRIT PANTOJA (Power of ) [phone] -- [phone]               History & Physical      Hieu Jefferson MD at 11/14/19 9775          History and Physical  Hieu Jefferson MD  Hospitalist    Date of admission: 11/14/2019    Patient Care Team:  Addy Canales MD as PCP - General (Family Medicine)    Chief complaint   Chief Complaint   Patient presents with   • Weakness - Generalized   • Poor appetite   • Fatigue     Subjective     Patient is a 79 " y.o. female brought over to the ER by her family for worsening confusion, weakness, poor appetite. I understand her care has become more difficult to handle at home as her overall condition is slowly declining.    History  Past Medical History:   Diagnosis Date   • Dementia (CMS/HCC)    • Osteoarthritis      Past Surgical History:   Procedure Laterality Date   • BLADDER AUGMENTATION     • BREAST BIOPSY     • HYSTERECTOMY       Family History   Problem Relation Age of Onset   • Cancer Father    • Heart disease Brother      Social History     Tobacco Use   • Smoking status: Never Smoker   • Smokeless tobacco: Never Used   Substance Use Topics   • Alcohol use: No   • Drug use: No     Medications Prior to Admission   Medication Sig Dispense Refill Last Dose   • donepezil (ARICEPT) 10 MG tablet 5 mg Every Night.      • memantine (NAMENDA) 10 MG tablet       • risperiDONE (risperDAL) 0.25 MG tablet Take 0.25 mg by mouth 2 (Two) Times a Day.        Allergies:  Patient has no known allergies.    Review of Systems  Review of Systems   Constitutional: Positive for fatigue. Negative for fever.   Respiratory: Negative for cough, chest tightness, wheezing and stridor.    Cardiovascular: Negative for chest pain, palpitations and leg swelling.   Gastrointestinal: Negative for abdominal distention, blood in stool, constipation, diarrhea and vomiting.   Genitourinary: Negative for decreased urine volume, dysuria, flank pain, frequency, hematuria, pelvic pain, vaginal discharge and vaginal pain.   Musculoskeletal: Positive for arthralgias. Negative for back pain, gait problem and joint swelling.   Skin: Positive for pallor.   Neurological: Positive for weakness. Negative for tremors, seizures, syncope, facial asymmetry, light-headedness and headaches.   Psychiatric/Behavioral: Positive for confusion. Negative for agitation and behavioral problems.   All other systems reviewed and are negative.      Objective     Vital Signs  Temp:   [97.6 °F (36.4 °C)-97.7 °F (36.5 °C)] 97.6 °F (36.4 °C)  Heart Rate:  [59-75] 62  Resp:  [18] 18  BP: (141-163)/(66-80) 163/74    Physical Exam:  Physical Exam   Constitutional: She appears well-developed and well-nourished. No distress.   HENT:   Head: Normocephalic and atraumatic.   Eyes: EOM are normal. Pupils are equal, round, and reactive to light. No scleral icterus.   Neck: Normal range of motion. Neck supple.   Cardiovascular: Normal rate and regular rhythm.   Pulmonary/Chest: Effort normal and breath sounds normal. No stridor. No respiratory distress. She has no wheezes.   Abdominal: Soft. Bowel sounds are normal. She exhibits no distension. There is no tenderness. There is no guarding.   Musculoskeletal: Normal range of motion. She exhibits no edema, tenderness or deformity.   Neurological: No cranial nerve deficit. Coordination normal.   Confused    Skin: Skin is warm and dry. No rash noted. No erythema. There is pallor.   Psychiatric: Her behavior is normal.   Vitals reviewed.      Results Review:   Lab Results (last 24 hours)     Procedure Component Value Units Date/Time    Guy Draw [[id number]] Collected:  11/14/19 2000    Specimen:  Blood Updated:  11/14/19 2140    Narrative:       The following orders were created for panel order [facility] Draw.  Procedure                               Abnormality         Status                     ---------                               -----------         ------                     Light Blue Top[[id number]]                                   Final result               Green Top (Gel)[[id number]]                                  Final result               Lavender Top[[id number]]                                     Final result               Gold Top - SST[[id number]]                                   Final result                 Please view results for these tests on the individual orders.    Light Blue Top [[id number]] Collected:  11/14/19 2000    Specimen:  Blood  Updated:  11/14/19 2140     Extra Tube hold for add-on     Comment: Auto resulted       Green Top (Gel) [[id number]] Collected:  11/14/19 2000    Specimen:  Blood Updated:  11/14/19 2140     Extra Tube Hold for add-ons.     Comment: Auto resulted.       Lavender Top [[id number]] Collected:  11/14/19 2000    Specimen:  Blood Updated:  11/14/19 2140     Extra Tube hold for add-on     Comment: Auto resulted       Gold Top - SST [[id number]] Collected:  11/14/19 2000    Specimen:  Blood Updated:  11/14/19 2140     Extra Tube Hold for add-ons.     Comment: Auto resulted.       Urinalysis With Microscopic If Indicated (No Culture) - Urine, Clean Catch [[id number]]  (Abnormal) Collected:  11/14/19 2106    Specimen:  Urine, Clean Catch Updated:  11/14/19 2119     Color, UA Yellow     Appearance, UA Clear     pH, UA 6.5     Specific Gravity, UA 1.026     Glucose, UA Negative     Ketones, UA Negative     Bilirubin, UA Negative     Blood, UA Negative     Protein, UA Negative     Leuk Esterase, UA Moderate (2+)     Nitrite, UA Negative     Urobilinogen, UA 1.0 E.U./dL    Urinalysis, Microscopic Only - Urine, Clean Catch [[id number]]  (Abnormal) Collected:  11/14/19 2106    Specimen:  Urine, Clean Catch Updated:  11/14/19 2119     RBC, UA 3-5 /HPF      WBC, UA 6-12 /HPF      Bacteria, UA 1+ /HPF      Squamous Epithelial Cells, UA 3-5 /HPF      Hyaline Casts, UA 0-2 /LPF      Methodology Automated Microscopy    T4, Free [[id number]]  (Normal) Collected:  11/14/19 2000    Specimen:  Blood Updated:  11/14/19 2059     Free T4 1.04 ng/dL     TSH [[id number]]  (Abnormal) Collected:  11/14/19 2000    Specimen:  Blood Updated:  11/14/19 2059     TSH 5.660 uIU/mL     Magnesium [[id number]]  (Normal) Collected:  11/14/19 2000    Specimen:  Blood Updated:  11/14/19 2046     Magnesium 2.0 mg/dL     Comprehensive Metabolic Panel [[id number]]  (Abnormal) Collected:  11/14/19 2000    Specimen:  Blood Updated:  11/14/19 2031     Glucose 91 mg/dL       BUN 19 mg/dL      Creatinine 0.81 mg/dL      Sodium 139 mmol/L      Potassium 4.0 mmol/L      Chloride 100 mmol/L      CO2 27.0 mmol/L      Calcium 9.4 mg/dL      Total Protein 6.8 g/dL      Albumin 4.00 g/dL      ALT (SGPT) 8 U/L      AST (SGOT) 21 U/L      Alkaline Phosphatase 93 U/L      Total Bilirubin <0.2 mg/dL      eGFR Non African Amer 68 mL/min/1.73      Globulin 2.8 gm/dL      A/G Ratio 1.4 g/dL      BUN/Creatinine Ratio 23.5     Anion Gap 12.0 mmol/L     Narrative:       GFR Normal >60  Chronic Kidney Disease <60  Kidney Failure <15    CBC & Differential [[id number]] Collected:  11/14/19 2000    Specimen:  Blood Updated:  11/14/19 2008    Narrative:       The following orders were created for panel order CBC & Differential.  Procedure                               Abnormality         Status                     ---------                               -----------         ------                     CBC Auto Differential[[id number]]        Abnormal            Final result                 Please view results for these tests on the individual orders.    CBC Auto Differential [[id number]]  (Abnormal) Collected:  11/14/19 2000    Specimen:  Blood Updated:  11/14/19 2008     WBC 9.09 10*3/mm3      RBC 4.29 10*6/mm3      Hemoglobin 12.0 g/dL      Hematocrit 36.6 %      MCV 85.3 fL      MCH 28.0 pg      MCHC 32.8 g/dL      RDW 13.8 %      RDW-SD 43.0 fl      MPV 9.1 fL      Platelets 399 10*3/mm3      Neutrophil % 53.6 %      Lymphocyte % 37.1 %      Monocyte % 7.0 %      Eosinophil % 1.7 %      Basophil % 0.3 %      Immature Grans % 0.3 %      Neutrophils, Absolute 4.87 10*3/mm3      Lymphocytes, Absolute 3.37 10*3/mm3      Monocytes, Absolute 0.64 10*3/mm3      Eosinophils, Absolute 0.15 10*3/mm3      Basophils, Absolute 0.03 10*3/mm3      Immature Grans, Absolute 0.03 10*3/mm3      nRBC 0.0 /100 WBC           Imaging Results (Last 24 Hours)     ** No results found for the last 24 hours. **          Assessment/Plan        Dementia (CMS/HCC)    Generalized weakness    Acute UTI (urinary tract infection)    Will provide her with gentle IV hydration, supportive care, IV Rocephin for the mild urinary tract infection. Her family considers placing her in a SNF upon discharge.    Hieu Jefferson MD  11/15/19  12:21 AM      Electronically signed by Hieu Jefferson MD at 11/15/19 0022

## 2019-11-15 NOTE — PLAN OF CARE
Problem: Fall Risk (Adult)  Goal: Identify Related Risk Factors and Signs and Symptoms  Outcome: Ongoing (interventions implemented as appropriate)    Goal: Absence of Fall  Outcome: Ongoing (interventions implemented as appropriate)      Problem: Patient Care Overview  Goal: Plan of Care Review  Outcome: Ongoing (interventions implemented as appropriate)   11/15/19 0324   Coping/Psychosocial   Plan of Care Reviewed With patient   Plan of Care Review   Progress no change   OTHER   Outcome Summary new admit; VSS; denies need for pain med; resting in between care; will continue to monitor     Goal: Individualization and Mutuality  Outcome: Ongoing (interventions implemented as appropriate)    Goal: Discharge Needs Assessment  Outcome: Ongoing (interventions implemented as appropriate)    Goal: Interprofessional Rounds/Family Conf  Outcome: Ongoing (interventions implemented as appropriate)      Problem: Urinary Tract Infection (Adult)  Goal: Signs and Symptoms of Listed Potential Problems Will be Absent, Minimized or Managed (Urinary Tract Infection)  Outcome: Ongoing (interventions implemented as appropriate)

## 2019-11-15 NOTE — ED NOTES
"Patient presents to ED with family at bedside for complaint of weakness and fatigue. Family states she had trouble walking around [employer] today and when eating \"She face planted her plate\". Patient has history of dementia and family states, \"she has good dementia days and today is just not a good day\"     Tiffanie Morales RN  11/14/19 1949    "

## 2019-11-15 NOTE — CONSULTS
Adult Nutrition  Assessment    Patient Name:  Salome Drake  YOB: 1940  MRN: [id number]  Admit Date:  11/14/2019    Assessment Date:  11/15/2019    Comments:  Pt admitted with worsening confusion, poor appetite and weakness with noted wt loss of ~5# in the last several weeks.  She is dxed with a UTI, Abx started.  Pt denies any eating difficulties.  RD obtained a Boost for pt to try--she did try it and liked it.  RD will add ice cream and Boost to optimize her intake. .      Reason for Assessment     Row Name 11/15/19 1331          Reason for Assessment    Reason For Assessment  identified at risk by screening criteria     Diagnosis  infection/sepsis     Identified At Risk by Screening Criteria  MST SCORE 2+         Nutrition/Diet History     Row Name 11/15/19 1331          Nutrition/Diet History    Typical Food/Fluid Intake  Pt and family report that she has not been eating well.  She has lost some wt, probably around 5# lately.  She denies any Gi distress.  Willing to try Boost.  RD obtained her one and she liked it.             Labs/Tests/Procedures/Meds     Row Name 11/15/19 1350          Labs/Procedures/Meds    Lab Results Reviewed  reviewed, pertinent        Diagnostic Tests/Procedures    Diagnostic Test/Procedure Reviewed  reviewed, pertinent        Medications    Pertinent Medications Reviewed  reviewed, pertinent     Pertinent Medications Comments  Abx           Estimated/Assessed Needs     Row Name 11/15/19 1350          Calculation Measurements    Weight Used For Calculations  62.1 kg (137 lb)        Estimated/Assessed Needs    Additional Documentation  Additional Requirements (Group);Fluid Requirements (Group);Baldwin-St. Jeor Equation (Group);Protein Requirements (Group);Calorie Requirements (Group);KCAL/KG (Group)        Calorie Requirements    Estimated Calorie Requirement (kcal/day)  1350     Estimated Calorie Need Method  Baldwin-St Jeor        Baldwin-St. Jeor Equation    RMR  (Foster-St. Jeor Equation)  1039.68        Protein Requirements    Weight Used For Protein Calculations  62.1 kg (137 lb)     Est Protein Requirement Amount (gms/kg)  1.1 gm protein     Estimated Protein Requirements (gms/day)  68.36        Fluid Requirements    Estimated Fluid Requirements (mL/day)  1500     Estimated Fluid Requirement Method  other (see comments)     RDA Method (mL)  1500     Lamberto-Radha Method (over 20 kg)  2742.86         Nutrition Prescription Ordered     Row Name 11/15/19 1353          Nutrition Prescription PO    Current PO Diet  Regular     Fluid Consistency  Thin         Evaluation of Received Nutrient/Fluid Intake     Row Name 11/15/19 1353          PO Evaluation    Number of Days PO Intake Evaluated  Insufficient Data     Number of Meals  1     % PO Intake  25%               Electronically signed by:  Archana Tuttle RD  11/15/19 1:56 PM

## 2019-11-15 NOTE — ED PROVIDER NOTES
Subjective   79-year-old white female presents the emergency department with a complaint of generalized weakness.  The patient has Alzheimer's dementia and states that she has no complaints.  She is very pleasant but states that she wants to go home.  She cannot recall the events of the day.  She has no specific complaints at this time.    Her daughter presents at the bedside with her and states that the patient and her  went to [employer] to do some shopping today.  She apparently was having some trouble getting around so he brought her home.  She slept in a chair for most of the afternoon.  At dinnertime, her daughter states that she would not sit up all the way and was leaning over her plate eating.  She has had no loss of consciousness.  She has no specific neurologic deficits.  The only complaint is a generalized weakness.            Review of Systems   Unable to perform ROS: Dementia       Past Medical History:   Diagnosis Date   • Bunion    • Dementia (CMS/HCC)    • Memory loss, short term    • Onychomycosis        No Known Allergies    Past Surgical History:   Procedure Laterality Date   • BLADDER AUGMENTATION     • BREAST BIOPSY     • HYSTERECTOMY         Family History   Problem Relation Age of Onset   • Cancer Father    • Cancer Brother    • Heart disease Brother        Social History     Socioeconomic History   • Marital status:      Spouse name: Not on file   • Number of children: Not on file   • Years of education: Not on file   • Highest education level: Not on file   Tobacco Use   • Smoking status: Never Smoker   • Smokeless tobacco: Never Used   Substance and Sexual Activity   • Alcohol use: No   • Drug use: No   • Sexual activity: No           Objective   Physical Exam   Constitutional: She appears well-developed and well-nourished. No distress.   HENT:   Head: Normocephalic and atraumatic.   Right Ear: External ear normal.   Left Ear: External ear normal.   Mouth/Throat: Oropharynx is  clear and moist. No oropharyngeal exudate.   Eyes: Conjunctivae and EOM are normal. Pupils are equal, round, and reactive to light. Right eye exhibits no discharge. Left eye exhibits no discharge. No scleral icterus.   Neck: Neck supple. No JVD present. No tracheal deviation present. No thyromegaly present.   Cardiovascular: Normal rate, regular rhythm, normal heart sounds and intact distal pulses. Exam reveals no friction rub.   No murmur heard.  Pulmonary/Chest: Effort normal and breath sounds normal. No stridor. No respiratory distress. She has no wheezes. She has no rales. She exhibits no tenderness.   Abdominal: Soft. Bowel sounds are normal. She exhibits no distension and no mass. There is no tenderness. There is no rebound and no guarding.   Musculoskeletal: She exhibits no edema, tenderness or deformity.   Lymphadenopathy:     She has no cervical adenopathy.   Neurological: She is alert. No cranial nerve deficit. She exhibits normal muscle tone. Coordination normal.   The patient is alert and interactive.  She is able to recall her first name and, with some difficulty, her last name.  She cannot tell me where she is and cannot provide me with any indication of time.   Skin: Skin is warm and dry. Capillary refill takes 2 to 3 seconds. No rash noted. She is not diaphoretic. No erythema.   Psychiatric: She has a normal mood and affect. Her behavior is normal. Judgment and thought content normal.   Nursing note and vitals reviewed.      Procedures           ED Course  ED Course as of Nov 14 2144   Thu Nov 14, 2019 2058 Patient was placed in room 5 and evaluated by me.  Physical exam was normal.  CBC and CMP were both normal.  Magnesium was 2.0.  [CE]   2130 Urinalysis shows leukocytosis and 1+ bacteria.  She was given Rocephin in the emergency department and the case was discussed with Dr. Jefferson who agrees to admit the patient for observation.  [CE]      ED Course User Index  [CE] Philipp Stoll DO         Labs Reviewed   COMPREHENSIVE METABOLIC PANEL - Abnormal; Notable for the following components:       Result Value    Total Bilirubin <0.2 (*)     All other components within normal limits    Narrative:     GFR Normal >60  Chronic Kidney Disease <60  Kidney Failure <15   URINALYSIS W/ MICROSCOPIC IF INDICATED (NO CULTURE) - Abnormal; Notable for the following components:    Leuk Esterase, UA Moderate (2+) (*)     All other components within normal limits   CBC WITH AUTO DIFFERENTIAL - Abnormal; Notable for the following components:    Lymphocytes, Absolute 3.37 (*)     All other components within normal limits   TSH - Abnormal; Notable for the following components:    TSH 5.660 (*)     All other components within normal limits   URINALYSIS, MICROSCOPIC ONLY - Abnormal; Notable for the following components:    RBC, UA 3-5 (*)     WBC, UA 6-12 (*)     Bacteria, UA 1+ (*)     Squamous Epithelial Cells, UA 3-5 (*)     All other components within normal limits   MAGNESIUM - Normal   T4, FREE - Normal   RAINBOW DRAW    Narrative:     The following orders were created for panel order [place] Draw.  Procedure                               Abnormality         Status                     ---------                               -----------         ------                     Light Blue Top[[id number]]                                   Final result               Green Top (Gel)[[id number]]                                  Final result               Lavender Top[[id number]]                                     Final result               Gold Top - SST[[id number]]                                   Final result                 Please view results for these tests on the individual orders.   CBC AND DIFFERENTIAL    Narrative:     The following orders were created for panel order CBC & Differential.  Procedure                               Abnormality         Status                     ---------                                -----------         ------                     CBC Auto Differential[[id number]]        Abnormal            Final result                 Please view results for these tests on the individual orders.   LIGHT BLUE TOP   GREEN TOP   LAVENDER TOP   GOLD TOP - SST     No results found.          MDM    Final diagnoses:   Weakness   Alzheimer's disease of other onset without behavioral disturbance (CMS/HCC)   Urinary tract infection without hematuria, site unspecified              Philipp Stoll,   11/14/19 3801

## 2019-11-15 NOTE — PLAN OF CARE
Problem: Patient Care Overview  Goal: Plan of Care Review  Outcome: Outcome(s) achieved Date Met: 11/15/19   11/15/19 6655   Coping/Psychosocial   Plan of Care Reviewed With patient;spouse;son   Plan of Care Review   Progress no change   OTHER   Outcome Summary PT evaluation completed and discharge complete. Pt presents independent with bed mobility tasks, at CGA with transfers without an AD and CGA with ambulation without an AD x 200'. Limited by severe confusion and weakness due to recent UTI. Discharged from PT to care of nursing staff. No follow up PT required. Pt's family in agreement with plan (spouse and pt's son). Recommend f/u skilled PT for conditioning at SNF upon discharge.

## 2019-11-15 NOTE — H&P
History and Physical  Hieu Jefferson MD  Hospitalist    Date of admission: 11/14/2019    Patient Care Team:  Addy Canales MD as PCP - General (Family Medicine)    Chief complaint   Chief Complaint   Patient presents with   • Weakness - Generalized   • Poor appetite   • Fatigue     Subjective     Patient is a 79 y.o. female brought over to the ER by her family for worsening confusion, weakness, poor appetite. I understand her care has become more difficult to handle at home as her overall condition is slowly declining.    History  Past Medical History:   Diagnosis Date   • Dementia (CMS/HCC)    • Osteoarthritis      Past Surgical History:   Procedure Laterality Date   • BLADDER AUGMENTATION     • BREAST BIOPSY     • HYSTERECTOMY       Family History   Problem Relation Age of Onset   • Cancer Father    • Heart disease Brother      Social History     Tobacco Use   • Smoking status: Never Smoker   • Smokeless tobacco: Never Used   Substance Use Topics   • Alcohol use: No   • Drug use: No     Medications Prior to Admission   Medication Sig Dispense Refill Last Dose   • donepezil (ARICEPT) 10 MG tablet 5 mg Every Night.      • memantine (NAMENDA) 10 MG tablet       • risperiDONE (risperDAL) 0.25 MG tablet Take 0.25 mg by mouth 2 (Two) Times a Day.        Allergies:  Patient has no known allergies.    Review of Systems  Review of Systems   Constitutional: Positive for fatigue. Negative for fever.   Respiratory: Negative for cough, chest tightness, wheezing and stridor.    Cardiovascular: Negative for chest pain, palpitations and leg swelling.   Gastrointestinal: Negative for abdominal distention, blood in stool, constipation, diarrhea and vomiting.   Genitourinary: Negative for decreased urine volume, dysuria, flank pain, frequency, hematuria, pelvic pain, vaginal discharge and vaginal pain.   Musculoskeletal: Positive for arthralgias. Negative for back pain, gait problem and joint swelling.   Skin: Positive for pallor.    Neurological: Positive for weakness. Negative for tremors, seizures, syncope, facial asymmetry, light-headedness and headaches.   Psychiatric/Behavioral: Positive for confusion. Negative for agitation and behavioral problems.   All other systems reviewed and are negative.      Objective     Vital Signs  Temp:  [97.6 °F (36.4 °C)-97.7 °F (36.5 °C)] 97.6 °F (36.4 °C)  Heart Rate:  [59-75] 62  Resp:  [18] 18  BP: (141-163)/(66-80) 163/74    Physical Exam:  Physical Exam   Constitutional: She appears well-developed and well-nourished. No distress.   HENT:   Head: Normocephalic and atraumatic.   Eyes: EOM are normal. Pupils are equal, round, and reactive to light. No scleral icterus.   Neck: Normal range of motion. Neck supple.   Cardiovascular: Normal rate and regular rhythm.   Pulmonary/Chest: Effort normal and breath sounds normal. No stridor. No respiratory distress. She has no wheezes.   Abdominal: Soft. Bowel sounds are normal. She exhibits no distension. There is no tenderness. There is no guarding.   Musculoskeletal: Normal range of motion. She exhibits no edema, tenderness or deformity.   Neurological: No cranial nerve deficit. Coordination normal.   Confused    Skin: Skin is warm and dry. No rash noted. No erythema. There is pallor.   Psychiatric: Her behavior is normal.   Vitals reviewed.      Results Review:   Lab Results (last 24 hours)     Procedure Component Value Units Date/Time    [place] Draw [[id number]] Collected:  11/14/19 2000    Specimen:  Blood Updated:  11/14/19 2140    Narrative:       The following orders were created for panel order [place] Draw.  Procedure                               Abnormality         Status                     ---------                               -----------         ------                     Light Blue Top[[id number]]                                   Final result               Green Top (Gel)[[id number]]                                  Final result                Lavender Top[[id number]]                                     Final result               Gold Top - SST[[id number]]                                   Final result                 Please view results for these tests on the individual orders.    Light Blue Top [[id number]] Collected:  11/14/19 2000    Specimen:  Blood Updated:  11/14/19 2140     Extra Tube hold for add-on     Comment: Auto resulted       Green Top (Gel) [[id number]] Collected:  11/14/19 2000    Specimen:  Blood Updated:  11/14/19 2140     Extra Tube Hold for add-ons.     Comment: Auto resulted.       Lavender Top [[id number]] Collected:  11/14/19 2000    Specimen:  Blood Updated:  11/14/19 2140     Extra Tube hold for add-on     Comment: Auto resulted       Gold Top - SST [[id number]] Collected:  11/14/19 2000    Specimen:  Blood Updated:  11/14/19 2140     Extra Tube Hold for add-ons.     Comment: Auto resulted.       Urinalysis With Microscopic If Indicated (No Culture) - Urine, Clean Catch [[id number]]  (Abnormal) Collected:  11/14/19 2106    Specimen:  Urine, Clean Catch Updated:  11/14/19 2119     Color, UA Yellow     Appearance, UA Clear     pH, UA 6.5     Specific Gravity, UA 1.026     Glucose, UA Negative     Ketones, UA Negative     Bilirubin, UA Negative     Blood, UA Negative     Protein, UA Negative     Leuk Esterase, UA Moderate (2+)     Nitrite, UA Negative     Urobilinogen, UA 1.0 E.U./dL    Urinalysis, Microscopic Only - Urine, Clean Catch [[id number]]  (Abnormal) Collected:  11/14/19 2106    Specimen:  Urine, Clean Catch Updated:  11/14/19 2119     RBC, UA 3-5 /HPF      WBC, UA 6-12 /HPF      Bacteria, UA 1+ /HPF      Squamous Epithelial Cells, UA 3-5 /HPF      Hyaline Casts, UA 0-2 /LPF      Methodology Automated Microscopy    T4, Free [[id number]]  (Normal) Collected:  11/14/19 2000    Specimen:  Blood Updated:  11/14/19 2059     Free T4 1.04 ng/dL     TSH [[id number]]  (Abnormal) Collected:  11/14/19 2000    Specimen:  Blood Updated:   11/14/19 2059     TSH 5.660 uIU/mL     Magnesium [[id number]]  (Normal) Collected:  11/14/19 2000    Specimen:  Blood Updated:  11/14/19 2046     Magnesium 2.0 mg/dL     Comprehensive Metabolic Panel [[id number]]  (Abnormal) Collected:  11/14/19 2000    Specimen:  Blood Updated:  11/14/19 2031     Glucose 91 mg/dL      BUN 19 mg/dL      Creatinine 0.81 mg/dL      Sodium 139 mmol/L      Potassium 4.0 mmol/L      Chloride 100 mmol/L      CO2 27.0 mmol/L      Calcium 9.4 mg/dL      Total Protein 6.8 g/dL      Albumin 4.00 g/dL      ALT (SGPT) 8 U/L      AST (SGOT) 21 U/L      Alkaline Phosphatase 93 U/L      Total Bilirubin <0.2 mg/dL      eGFR Non African Amer 68 mL/min/1.73      Globulin 2.8 gm/dL      A/G Ratio 1.4 g/dL      BUN/Creatinine Ratio 23.5     Anion Gap 12.0 mmol/L     Narrative:       GFR Normal >60  Chronic Kidney Disease <60  Kidney Failure <15    CBC & Differential [[id number]] Collected:  11/14/19 2000    Specimen:  Blood Updated:  11/14/19 2008    Narrative:       The following orders were created for panel order CBC & Differential.  Procedure                               Abnormality         Status                     ---------                               -----------         ------                     CBC Auto Differential[[id number]]        Abnormal            Final result                 Please view results for these tests on the individual orders.    CBC Auto Differential [[id number]]  (Abnormal) Collected:  11/14/19 2000    Specimen:  Blood Updated:  11/14/19 2008     WBC 9.09 10*3/mm3      RBC 4.29 10*6/mm3      Hemoglobin 12.0 g/dL      Hematocrit 36.6 %      MCV 85.3 fL      MCH 28.0 pg      MCHC 32.8 g/dL      RDW 13.8 %      RDW-SD 43.0 fl      MPV 9.1 fL      Platelets 399 10*3/mm3      Neutrophil % 53.6 %      Lymphocyte % 37.1 %      Monocyte % 7.0 %      Eosinophil % 1.7 %      Basophil % 0.3 %      Immature Grans % 0.3 %      Neutrophils, Absolute 4.87 10*3/mm3      Lymphocytes, Absolute  3.37 10*3/mm3      Monocytes, Absolute 0.64 10*3/mm3      Eosinophils, Absolute 0.15 10*3/mm3      Basophils, Absolute 0.03 10*3/mm3      Immature Grans, Absolute 0.03 10*3/mm3      nRBC 0.0 /100 WBC           Imaging Results (Last 24 Hours)     ** No results found for the last 24 hours. **          Assessment/Plan       Dementia (CMS/[facility])    Generalized weakness    Acute UTI (urinary tract infection)    Will provide her with gentle IV hydration, supportive care, IV Rocephin for the mild urinary tract infection. Her family considers placing her in a SNF upon discharge.    Hieu Jefferson MD  11/15/19  12:21 AM

## 2019-11-16 LAB — BACTERIA SPEC AEROBE CULT: ABNORMAL

## 2019-11-16 PROCEDURE — 25010000002 CEFTRIAXONE PER 250 MG: Performed by: INTERNAL MEDICINE

## 2019-11-16 PROCEDURE — G0378 HOSPITAL OBSERVATION PER HR: HCPCS

## 2019-11-16 PROCEDURE — 96366 THER/PROPH/DIAG IV INF ADDON: CPT

## 2019-11-16 RX ADMIN — CEFTRIAXONE SODIUM 1 G: 1 INJECTION, POWDER, FOR SOLUTION INTRAMUSCULAR; INTRAVENOUS at 21:47

## 2019-11-16 RX ADMIN — MEMANTINE 10 MG: 5 TABLET ORAL at 08:35

## 2019-11-16 RX ADMIN — SODIUM CHLORIDE, PRESERVATIVE FREE 10 ML: 5 INJECTION INTRAVENOUS at 21:47

## 2019-11-16 RX ADMIN — RISPERIDONE 0.25 MG: 0.25 TABLET ORAL at 08:35

## 2019-11-16 RX ADMIN — DONEPEZIL HYDROCHLORIDE 5 MG: 5 TABLET, FILM COATED ORAL at 21:47

## 2019-11-16 RX ADMIN — RISPERIDONE 0.25 MG: 0.25 TABLET ORAL at 21:47

## 2019-11-16 RX ADMIN — SODIUM CHLORIDE, PRESERVATIVE FREE 10 ML: 5 INJECTION INTRAVENOUS at 08:36

## 2019-11-16 NOTE — PLAN OF CARE
Problem: Patient Care Overview  Goal: Plan of Care Review  Outcome: Ongoing (interventions implemented as appropriate)   11/16/19 0322   Coping/Psychosocial   Plan of Care Reviewed With patient;daughter   Plan of Care Review   Progress improving   OTHER   Outcome Summary vss, no n/v, ambulating strongly with minimal standby assistance, patient is pleasantly confused and alert to person only       Problem: Urinary Tract Infection (Adult)  Goal: Signs and Symptoms of Listed Potential Problems Will be Absent, Minimized or Managed (Urinary Tract Infection)  Outcome: Ongoing (interventions implemented as appropriate)   11/16/19 0322   Goal/Outcome Evaluation   Problems Assessed (Urinary Tract Infection) all   Problems Present (UTI) none

## 2019-11-16 NOTE — PLAN OF CARE
Problem: Patient Care Overview  Goal: Plan of Care Review  Outcome: Ongoing (interventions implemented as appropriate)   11/16/19 8886   Coping/Psychosocial   Plan of Care Reviewed With patient   Plan of Care Review   Progress improving   OTHER   Outcome Summary vss, no distress, no complaints, no pain. pt still confused, alert to person only and daughter at bedside.

## 2019-11-17 VITALS
RESPIRATION RATE: 18 BRPM | HEART RATE: 75 BPM | HEIGHT: 62 IN | OXYGEN SATURATION: 97 % | TEMPERATURE: 97.6 F | DIASTOLIC BLOOD PRESSURE: 71 MMHG | SYSTOLIC BLOOD PRESSURE: 158 MMHG | WEIGHT: 137 LBS | BODY MASS INDEX: 25.21 KG/M2

## 2019-11-17 PROCEDURE — G0378 HOSPITAL OBSERVATION PER HR: HCPCS

## 2019-11-17 PROCEDURE — 97166 OT EVAL MOD COMPLEX 45 MIN: CPT

## 2019-11-17 RX ORDER — AMOXICILLIN AND CLAVULANATE POTASSIUM 875; 125 MG/1; MG/1
1 TABLET, FILM COATED ORAL 2 TIMES DAILY
Qty: 8 TABLET | Refills: 0 | Status: SHIPPED | OUTPATIENT
Start: 2019-11-17 | End: 2019-11-21

## 2019-11-17 RX ADMIN — MEMANTINE 10 MG: 5 TABLET ORAL at 10:04

## 2019-11-17 RX ADMIN — SODIUM CHLORIDE, PRESERVATIVE FREE 10 ML: 5 INJECTION INTRAVENOUS at 10:04

## 2019-11-17 RX ADMIN — RISPERIDONE 0.25 MG: 0.25 TABLET ORAL at 10:04

## 2019-11-17 NOTE — PLAN OF CARE
Problem: Patient Care Overview  Goal: Plan of Care Review  Outcome: Ongoing (interventions implemented as appropriate)   11/17/19 1104   Coping/Psychosocial   Plan of Care Reviewed With patient   Plan of Care Review   Progress improving   OTHER   Outcome Summary vss, no distress, no pain, still confused, family remains at bedside.

## 2019-11-17 NOTE — PLAN OF CARE
Problem: Patient Care Overview  Goal: Plan of Care Review  Outcome: Ongoing (interventions implemented as appropriate)   11/17/19 0129   Coping/Psychosocial   Plan of Care Reviewed With patient;durable power of ;daughter;son   Plan of Care Review   Progress improving   OTHER   Outcome Summary OT eval completed on this date. Pt pleasant and agreeable to therapy. Pt is very confused and only oriented to person on this date. Pt requires Max v/c's for attention to task and explanation with little carryover. Pt demos poor safety awareness secondary to dementia. Bed Mobility: Independent Transfers: CGA Fucntional Mobility: CGA with no AD for up to 300' (also requiring max v/c's to find room) LBD: CGA while sitting EOB. Pt is not safe to return home at this time without 24/7 supervision. Pt is at an increased risk for fall d/t poor safety awareness and increased confusion/dementia. Pt would most benefit from 24/7 supervision allowing her to remain safe and independent. Pt has no further skilled OT needs during hospital stay as she is at baseline performance with completion of ADL's. Will d/c OT.

## 2019-11-17 NOTE — PROGRESS NOTES
As above      [facility]  INPATIENT PROGRESS NOTE    Length of Stay: 0  Date of Admission: 11/14/2019  Primary Care Physician: Addy Canales MD    Subjective   Chief Complaint: weakness, confusion    HPI:  79 year old  female with past medical history of dementia, OA who presented on 11/14/19 with complaints of worsening confusion, weakness, anorexia per family report.  She is found to have UTI and is admitted for treatment of UTI and possible SNF placement due to worsening dementia.  During today's visit, the patient is pleasantly confused and denies complaints.     Review of Systems   Unable to perform ROS: Dementia      All pertinent negatives and positives are as above. All other systems have been reviewed and are negative unless otherwise stated.     Objective    Temp:  [97.1 °F (36.2 °C)-98.2 °F (36.8 °C)] 97.8 °F (36.6 °C)  Heart Rate:  [66-74] 66  Resp:  [18] 18  BP: (128-160)/(64-79) 160/69    Physical Exam   Constitutional: She appears well-developed and well-nourished. No distress.   HENT:   Head: Normocephalic and atraumatic.   Right Ear: External ear normal.   Left Ear: External ear normal.   Nose: Nose normal.   Eyes: Conjunctivae are normal. Right eye exhibits no discharge. Left eye exhibits no discharge.   Neck: Normal range of motion. Neck supple. No JVD present.   Cardiovascular: Normal rate, regular rhythm, normal heart sounds and intact distal pulses. Exam reveals no gallop and no friction rub.   No murmur heard.  Pulmonary/Chest: Effort normal and breath sounds normal. No stridor. No respiratory distress. She has no wheezes. She has no rales.   Abdominal: Soft. Bowel sounds are normal. She exhibits no distension. There is no tenderness.   Musculoskeletal: Normal range of motion. She exhibits no edema.   Neurological: She is alert.   Oriented to person only   Skin: Skin is warm and dry. She is not diaphoretic.   Psychiatric: She  has a normal mood and affect. Her behavior is normal.   Nursing note and vitals reviewed.      Results Review:  I have reviewed the labs, radiology results, and diagnostic studies.    Laboratory Data:   Results from last 7 days   Lab Units 11/14/19 2000   SODIUM mmol/L 139   POTASSIUM mmol/L 4.0   CHLORIDE mmol/L 100   CO2 mmol/L 27.0   BUN mg/dL 19   CREATININE mg/dL 0.81   GLUCOSE mg/dL 91   CALCIUM mg/dL 9.4   BILIRUBIN mg/dL <0.2*   ALK PHOS U/L 93   ALT (SGPT) U/L 8   AST (SGOT) U/L 21   ANION GAP mmol/L 12.0     Estimated Creatinine Clearance: 47.2 mL/min (by C-G formula based on SCr of 0.81 mg/dL).  Results from last 7 days   Lab Units 11/14/19 2000   MAGNESIUM mg/dL 2.0         Results from last 7 days   Lab Units 11/14/19 2000   WBC 10*3/mm3 9.09   HEMOGLOBIN g/dL 12.0   HEMATOCRIT % 36.6   PLATELETS 10*3/mm3 399           Culture Data:   No results found for: BLOODCX  Urine Culture   Date Value Ref Range Status   11/14/2019 >100,000 CFU/mL Corynebacterium species (A)  Final     Comment:       No definitive guidelines. All are susceptible to vancomycin. Resistance to penicillins & cephalosporins does occur.     No results found for: RESPCX  No results found for: WOUNDCX  No results found for: STOOLCX  No components found for: BODYFLD    Radiology Data:   Imaging Results (Last 24 Hours)     ** No results found for the last 24 hours. **          I have reviewed the patient's current medications.     Assessment/Plan     Active Hospital Problems    Diagnosis   • **Dementia (CMS/[facility])   • Generalized weakness   • Acute UTI (urinary tract infection)       Plan:    1. Acute cystitis: Urine culture growing corynebacterium.  Patient has shown good response with IV ceftriaxone.  We will continue with this medication and completed 3-day course.  2. Dementia: continue Aricept, Namenda, Risperdal.  3. Generalized weakness/deconditioning: Awaiting PT/OT recommend SNF.  However patient daughter do not want this at this  time.  Patient's son is the POA but they state that there is another daughter that is a POA as well and she will bring POA forms.  They state that she does not want SNF placement.    Discharge Planning: I expect patient to be discharged in the next 24 hours to SNF for home with home health depending on patient's family and POA decision.    This document has been electronically signed by Parminder Dupont MD on November 16, 2019 8:00 PM

## 2019-11-17 NOTE — PLAN OF CARE
Problem: Patient Care Overview  Goal: Plan of Care Review  Outcome: Ongoing (interventions implemented as appropriate)   11/16/19 0954 11/16/19 2962   Coping/Psychosocial   Plan of Care Reviewed With --  patient   Plan of Care Review   Progress --  improving   OTHER   Outcome Summary vss, no distress, no complaints, no pain. pt still confused, alert to person only and daughter at bedside.  --

## 2019-11-17 NOTE — DISCHARGE INSTR - LAB
Dr Canales  1 Week  [phone]  Info was sent to the office. If they do not contact you within one business day with appointment info, please call.

## 2019-11-17 NOTE — PLAN OF CARE
Problem: Urinary Tract Infection (Adult)  Goal: Signs and Symptoms of Listed Potential Problems Will be Absent, Minimized or Managed (Urinary Tract Infection)  Outcome: Ongoing (interventions implemented as appropriate)   11/16/19 9897   Goal/Outcome Evaluation   Problems Assessed (Urinary Tract Infection) all   Problems Present (UTI) none

## 2019-11-17 NOTE — THERAPY DISCHARGE NOTE
Acute Care - Occupational Therapy Initial Eval/Discharge  [facility]     Patient Name: Salome Drake  : 1940  MRN: [id number]  Today's Date: 2019  Onset of Illness/Injury or Date of Surgery: 19  Date of Referral to OT: 11/15/19  Referring Physician: VIJI Mosqueda      Admit Date: 2019       ICD-10-CM ICD-9-CM   1. Weakness R53.1 780.79   2. Alzheimer's disease of other onset without behavioral disturbance (CMS/HCC) G30.8 331.0    F02.80 294.10   3. Urinary tract infection without hematuria, site unspecified N39.0 599.0   4. Impaired mobility and activities of daily living Z74.09 799.89     Patient Active Problem List   Diagnosis   • Generalized weakness   • Acute UTI (urinary tract infection)   • Dementia (CMS/HCC)     Past Medical History:   Diagnosis Date   • Dementia (CMS/HCC)    • Osteoarthritis      Past Surgical History:   Procedure Laterality Date   • BLADDER AUGMENTATION     • BREAST BIOPSY     • HYSTERECTOMY            OT ASSESSMENT FLOWSHEET (last 12 hours)      Occupational Therapy Evaluation     Row Name 19 0742                   OT Evaluation Time/Intention    Subjective Information  no complaints  -        Document Type  evaluation  -        Mode of Treatment  individual therapy;occupational therapy  -        Patient Effort  good  -           General Information    Patient Profile Reviewed?  yes  -        Onset of Illness/Injury or Date of Surgery  19  -        Referring Physician  VIJI Mosqueda  [facility]        Patient Observations  alert;cooperative;agree to therapy  -        Patient/Family Observations  Daughter present  -        Prior Level of Function  independent:;ADL's;all household mobility  -        Equipment Currently Used at Home  none  -        Existing Precautions/Restrictions  fall  -        Limitations/Impairments  safety/cognitive  -        Risks Reviewed  patient and  family:;LOB;nausea/vomiting;dizziness;increased discomfort;change in vital signs;increased drainage;lines disloged  -        Benefits Reviewed  patient and family:;improve function;increase independence;increase strength;increase balance;decrease pain;decrease risk of DVT;improve skin integrity;increase knowledge  -           Relationship/Environment    Primary Source of Support/Comfort  child(tomas);spouse  -        Lives With  spouse;child(tomas), adult  -        Primary Roles/Responsibilities  retired  -           Resource/Environmental Concerns    Current Living Arrangements  home/apartment/condo  -        Resource/Environmental Concerns  none  -           Home Main Entrance    Number of Stairs, Main Entrance  three  -        Stair Railings, Main Entrance  railing on right side (ascending)  -           Cognitive Assessment/Intervention- PT/OT    Orientation Status (Cognition)  oriented to;person;disoriented to;situation;place;time  -        Follows Commands (Cognition)  follows one step commands;50-74% accuracy;physical/tactile prompts required;repetition of directions required;verbal cues/prompting required;delayed response/completion  -        Safety Deficit (Cognitive)  impulsivity;safety precautions awareness;safety precautions follow-through/compliance;insight into deficits/self awareness;awareness of need for assistance  -           Safety Issues, Functional Mobility    Safety Issues Affecting Function (Mobility)  ability to follow commands;impulsivity;safety precaution awareness  [facility]        Impairments Affecting Function (Mobility)  cognition;balance  -           Bed Mobility Assessment/Treatment    Bed Mobility Assessment/Treatment  bed mobility (all) activities  -        [place] Level (Bed Mobility)  independent  -        Assistive Device (Bed Mobility)  bed rails  -           Functional Mobility    Functional Mobility- Ind. Level  contact guard assist  -         Functional Mobility-Distance (Feet)  300  -           Transfer Assessment/Treatment    Transfer Assessment/Treatment  sit-stand transfer;stand-sit transfer;toilet transfer  -           Sit-Stand Transfer    Sit-Stand [place] (Transfers)  stand by assist  [facility]           Stand-Sit Transfer    Stand-Sit [place] (Transfers)  stand by assist  -JH           Toilet Transfer    Type (Toilet Transfer)  sit-stand;stand-sit  -        [place] Level (Toilet Transfer)  stand by assist  -JH           ADL Assessment/Intervention    BADL Assessment/Intervention  lower body dressing;grooming  -           Lower Body Dressing Assessment/Training    Lower Body Dressing [place] Level  doff;don;socks;supervision  -        Lower Body Dressing Position  edge of bed sitting  -           Grooming Assessment/Training    [place] Level (Grooming)  grooming skills;contact guard assist;verbal cues;nonverbal cues (demo/gesture)  [facility]        Grooming Position  unsupported standing  -           BADL Safety/Performance    Impairments, [facility] Safety/Performance  cognition;balance;endurance/activity tolerance;strength  -           General ROM    GENERAL ROM COMMENTS  BUE ROM WFL  -           MMT (Manual Muscle Testing)    General MMT Comments  BUE MMT 3+/5 [facility]           Sensory Assessment/Intervention    Sensory General Assessment  no sensation deficits identified  -           Positioning and Restraints    Pre-Treatment Position  in bed  -        Post Treatment Position  bed  -        In Bed  sitting EOB;call light within reach;encouraged to call for assist;notified nsg;with family/caregiver  [facility]           Pain Scale: Numbers Pre/Post-Treatment    Pain Scale: Numbers, Pretreatment  0/10 - no pain  -        Pain Scale: Numbers, Post-Treatment  0/10 - no pain  [facility]           Clinical Impression (OT)    Date of Referral to OT  11/15/19  -        OT Diagnosis  Impaired mobility and ADL's  -         Criteria for Skilled Therapeutic Interventions Met (OT Eval)  current level of function same as previous level of function  -        Rehab Potential (OT Eval)  --  -        Care Plan Review (OT)  evaluation/treatment results reviewed;care plan/treatment goals reviewed;risks/benefits reviewed;current/potential barriers reviewed;patient/other agree to care plan  -        Anticipated Discharge Disposition (OT)  home with 24/7 care;assisted living facility (retirement);skilled nursing facility  -        Patient/Family Concerns, Anticipated Discharge Disposition (OT)  Hygiene and nutrition are main concerns  -           Vital Signs    Pre Systolic BP Rehab  120  -        Pre Treatment Diastolic BP  58  -        Post Systolic BP Rehab  123  -JH        Post Treatment Diastolic BP  59  -        Pretreatment Heart Rate (beats/min)  67  -        Posttreatment Heart Rate (beats/min)  68  -        Pre SpO2 (%)  97  -        O2 Delivery Pre Treatment  room air  -        Post SpO2 (%)  98  -        O2 Delivery Post Treatment  room air  -        Pre Patient Position  Sitting  -        Intra Patient Position  Standing  -        Post Patient Position  Sitting  -           Discharge Summary (Occupational Therapy)    Additional Documentation  Discharge Summary, OT Eval (Group)  -           Discharge Summary, OT Eval    Reason for Discharge (OT Discharge Summary)  no further needs identified  -           Living Environment    Home Accessibility  stairs to enter home  -          User Key  (r) = Recorded By, (t) = Taken By, (c) = Cosigned By    Initials Name Effective Dates    Miguelito Kaur OT 09/10/19 -           Occupational Therapy Education     Title: PT OT SLP Therapies (In Progress)     Topic: Occupational Therapy (In Progress)     Point: Precautions (Done)     Description: Instruct learner(s) on prescribed precautions during self-care and functional transfers.    Learning Progress Summary            Patient Acceptance, IRENE BOO VU, DU,NR by  at 11/17/2019 12:28 PM    Comment:  Pt educated on use of non-skid socks and gait belt when OOB. Pt also educated on use of call light for assistance.   Family Acceptance, IRENE BOO VU, DU,NR by  at 11/17/2019 12:28 PM    Comment:  Pt educated on use of non-skid socks and gait belt when OOB. Pt also educated on use of call light for assistance.                               User Key     Initials Effective Dates Name Provider Type Discipline     09/10/19 -  Miguelito Salas OT Occupational Therapist OT                OT Recommendation and Plan  Outcome Summary/Treatment Plan (OT)  Anticipated Discharge Disposition (OT): home with 24/7 care, assisted living facility ([facility]), skilled nursing facility  Patient/Family Concerns, Anticipated Discharge Disposition (OT): Hygiene and nutrition are main concerns  Reason for Discharge (OT Discharge Summary): no further needs identified  Plan of Care Review  Plan of Care Reviewed With: patient, durable power of , daughter, son  Plan of Care Reviewed With: patient, durable power of , daughter, son  Outcome Summary: OT eval completed on this date. Pt pleasant and agreeable to therapy. Pt is very confused and only oriented to person on this date. Pt requires Max v/c's for attention to task and explanation with little carryover. Pt demos poor safety awareness secondary to dementia. Bed Mobility: Independent Transfers: CGA Fucntional Mobility: CGA with no AD for up to 300' (also requiring max v/c's to find room) LBD: CGA while sitting EOB. Pt is not safe to return home at this time without 24/7 supervision. Pt is at an increased risk for fall d/t poor safety awareness and increased confusion/dementia. Pt would most benefit from 24/7 supervision allowing her to remain safe and independent. Pt has no further skilled OT needs during hospital stay as she is at baseline performance with completion of ADL's. Will d/c OT.         Outcome  Measures     Row Name 11/17/19 0742 11/15/19 1336          How much help from another person do you currently need...    Turning from your back to your side while in flat bed without using bedrails?  --  4  -BS     Moving from lying on back to sitting on the side of a flat bed without bedrails?  --  4  -BS     Moving to and from a bed to a chair (including a wheelchair)?  --  3  -BS     Standing up from a chair using your arms (e.g., wheelchair, bedside chair)?  --  3  -BS     Climbing 3-5 steps with a railing?  --  3  -BS     To walk in hospital room?  --  3  -BS     AM-PAC 6 Clicks Score (PT)  --  20  -BS        How much help from another is currently needed...    Putting on and taking off regular lower body clothing?  3  -  --     Bathing (including washing, rinsing, and drying)  3  -  --     Toileting (which includes using toilet bed pan or urinal)  3  -  --     Putting on and taking off regular upper body clothing  3  -  --     Taking care of personal grooming (such as brushing teeth)  3  -  --     Eating meals  3  -  --     AM-PAC 6 Clicks Score (OT)  18  -  --        Functional Assessment    Outcome Measure Options  AM-PAC 6 Clicks Daily Activity (OT)  -  AM-PAC 6 Clicks Basic Mobility (PT)  -       User Key  (r) = Recorded By, (t) = Taken By, (c) = Cosigned By    Initials Name Provider Type    Alexander Ansari, PT Physical Therapist     Miguelito Salas OT Occupational Therapist          Time Calculation:   Time Calculation- OT     Row Name 11/17/19 1225             Time Calculation- OT    OT Start Time  0742  -      OT Stop Time  0845  -      OT Time Calculation (min)  63 min  -      OT Received On  11/17/19  -        User Key  (r) = Recorded By, (t) = Taken By, (c) = Cosigned By    Initials Name Provider Type    Miguelito Kaur OT Occupational Therapist        Therapy Suggested Charges     Code   Minutes Charges    None           Therapy Charges for Today     Code Description  Service Date Service Provider Modifiers Qty    45707499295 HC OT EVAL MOD COMPLEXITY 4 11/17/2019 Miguelito Salas, FRANCISCO JAVIER GO 1               OT Discharge Summary  Anticipated Discharge Disposition (OT): home with 24/7 care, assisted living facility (jail), skilled nursing facility    Miguelito Salas OT  11/17/2019

## 2019-11-18 ENCOUNTER — DOCUMENTATION (OUTPATIENT)
Dept: PHYSICAL THERAPY | Facility: HOSPITAL | Age: 79
End: 2019-11-18

## 2019-11-18 NOTE — THERAPY EVALUATION
Acute Care - Physical Therapy Initial Evaluation  [facility]     Patient Name: Salome Drake  : 1940  MRN: [id number]  Today's Date: 2019   Onset of Illness/Injury or Date of Surgery: 19  Date of Referral to PT: 11/15/19  Referring Physician: VIJI Mosqueda      Admit Date: 2019    Visit Dx:     ICD-10-CM ICD-9-CM   1. Weakness R53.1 780.79   2. Alzheimer's disease of other onset without behavioral disturbance (CMS/HCC) G30.8 331.0    F02.80 294.10   3. Urinary tract infection without hematuria, site unspecified N39.0 599.0   4. Impaired mobility and activities of daily living Z74.09 799.89     Patient Active Problem List   Diagnosis   • Generalized weakness   • Acute UTI (urinary tract infection)   • Dementia (CMS/HCC)     Past Medical History:   Diagnosis Date   • Dementia (CMS/HCC)    • Osteoarthritis      Past Surgical History:   Procedure Laterality Date   • BLADDER AUGMENTATION     • BREAST BIOPSY     • HYSTERECTOMY          PT ASSESSMENT (last 12 hours)      Physical Therapy Evaluation    No documentation.           11/15/19 1336   PT Evaluation Time/Intention   Subjective Information complains of  (no appetite)   Document Type evaluation   Mode of Treatment individual therapy;physical therapy   Patient Effort good   Symptoms Noted During/After Treatment none   General Information   Patient Profile Reviewed? yes   Onset of Illness/Injury or Date of Surgery 11/15/19   Referring Physician VIJI Mosqueda   Patient Observations alert;cooperative;agree to therapy   Patient/Family Observations pt's  and son at bedside   General Observations of Patient IV in L arm    Prior Level of Function (assisted with ADL's, able to ambulate short distances, no AD)   Equipment Currently Used at Home none   Pertinent History of Current Functional Problem 78 yo female presenting with increased confusion, weakness and poor appetite on 19. +UTI   Existing  Precautions/Restrictions fall;other (see comments)  (h/o dementia)   Relationship/Environment   Primary Source of Support/Comfort spouse   Lives With spouse   Resource/Environmental Concerns   Current Living Arrangements home/apartment/condo   Living Environment   Living Arrangements house   Home Accessibility stairs to enter home   Home Main Entrance   Number of Stairs, Main Entrance three   Cognitive Assessment/Intervention- PT/OT   Orientation Status (Cognition) oriented to;person   Follows Commands (Cognition) follows one step commands;50-74% accuracy;physical/tactile prompts required;repetition of directions required;verbal cues/prompting required;delayed response/completion   Safety Deficit (Cognitive) impulsivity;mild deficit   Safety Issues, Functional Mobility   Safety Issues Affecting Function (Mobility) ability to follow commands;impulsivity;safety precaution awareness   Impairments Affecting Function (Mobility) cognition;balance   Bed Mobility Assessment/Treatment   Bed Mobility Assessment/Treatment bed mobility (all) activities   [place] Level (Bed Mobility) independent   Transfer Assessment/Treatment   Transfer Assessment/Treatment sit-stand transfer;stand-sit transfer   Sit-Stand [place] (Transfers) stand by assist   Stand-Sit [place] (Transfers) stand by assist   Sit-Stand Transfer   Assistive Device (Sit-Stand Transfers) other (see comments)  (no AD)   Stand-Sit Transfer   Assistive Device (Stand-Sit Transfers) other (see comments)  (no AD)   Gait/Stairs Assessment/Training   [place] Level (Gait) contact guard   Assistive Device (Gait) other (see comments)  (no AD, intermittently held hallway railing )   Distance in Feet (Gait) 200'   Pattern (Gait) step-through   Deviations/Abnormal Patterns (Gait) base of support, wide;gino decreased   Comment (Gait/Stairs) 1 LOB episode noted with maintaining Romberg stance with sternal nudge   General ROM   GENERAL ROM COMMENTS B UE/LE  "AROM WFL   MMT (Manual Muscle Testing)   General MMT Comments B LE's grossly 5/5   Sensory Assessment/Intervention   Sensory General Assessment other (see comments)  (NT due to severe dementia)   Pain Assessment   Additional Documentation Pain Scale: Numbers Pre/Post-Treatment (Group)   Pain Scale: Numbers Pre/Post-Treatment   Pain Scale: Numbers, Pretreatment 0/10 - no pain   Pain Scale: Numbers, Post-Treatment 0/10 - no pain   Plan of Care Review   Plan of Care Reviewed With patient;spouse;son   Physical Therapy Clinical Impression   Date of Referral to PT 11/15/19   PT Diagnosis (PT Clinical Impression) difficulty with ambulation   Patient/Family Goals Statement (PT Clinical Impression) \"go home\"   Criteria for Skilled Interventions Met (PT Clinical Impression) no   Impairments Found (describe specific impairments) aerobic capacity/endurance;gait, locomotion, and balance   Therapy Frequency (PT Clinical Impression) evaluation only   Anticipated Discharge Disposition (PT) skilled nursing facility  ([facility])   Vital Signs   Pre Systolic BP Rehab 129   Pre Treatment Diastolic BP 69   Pretreatment Heart Rate (beats/min) 65   Pre SpO2 (%) 95   O2 Delivery Pre Treatment room air   Pre Patient Position Supine   Intra Patient Position Standing   Post Patient Position Supine   Positioning and Restraints   Pre-Treatment Position in bed   Post Treatment Position bed   In Bed notified nsg;call light within reach;exit alarm on;with family/caregiver       PT Recommendation and Plan  Anticipated Discharge Disposition (PT): skilled nursing facility([facility])  Therapy Frequency (PT Clinical Impression): evaluation only  Outcome Summary/Treatment Plan (PT)  Anticipated Discharge Disposition (PT): skilled nursing facility([facility])  Plan of Care Reviewed With: patient, spouse, son  Progress: no change  Outcome Summary: PT evaluation completed and " discharge complete. Pt presents independent with bed mobility tasks, at CGA with transfers without an AD and CGA with ambulation without an AD x 200'. Limited by severe confusion and weakness due to recent UTI. Discharged from PT to care of nursing staff. No follow up PT required. Recommend f/u skilled PT for conditioning at SNF upon discharge.   Outcome Measures     Row Name 11/17/19 0742             How much help from another is currently needed...    Putting on and taking off regular lower body clothing?  3  -JH      Bathing (including washing, rinsing, and drying)  3  -JH      Toileting (which includes using toilet bed pan or urinal)  3  -JH      Putting on and taking off regular upper body clothing  3  -JH      Taking care of personal grooming (such as brushing teeth)  3  -JH      Eating meals  3  -      AM-[facility] 6 Clicks Score (OT)  18  -         Functional Assessment    Outcome Measure Options  AM-[facility] 6 Clicks Daily Activity (OT)  -        User Key  (r) = Recorded By, (t) = Taken By, (c) = Cosigned By    Initials Name Provider Type     Miguelito Salas, OT Occupational Therapist          11/15/19 1336   How much help from another person do you currently need...   Turning from your back to your side while in flat bed without using bedrails? 4   Moving from lying on back to sitting on the side of a flat bed without bedrails? 4   Moving to and from a bed to a chair (including a wheelchair)? 3   Standing up from a chair using your arms (e.g., wheelchair, bedside chair)? 3   Climbing 3-5 steps with a railing? 3   To walk in hospital room? 3   AM-[facility] 6 Clicks Score (PT) 20   Functional Assessment   Outcome Measure Options -[facility] 6 Clicks Basic Mobility (PT)        Time Calculation:       11/15/19 1404   Time Calculation- PT   Start Time 1336   Stop Time 1400   Time Calculation (min) 24 min   Total Timed Code Minutes- PT 24 minute(s)   PT Goal Re-Cert Due Date (N/A)     PT charges:  HC PT EVAL MOD COMPLEXITY 2, 1  charge      PT G-Codes  Outcome Measure Options: AM-PAC 6 Clicks Daily Activity (OT)  AM-PAC 6 Clicks Score (PT): 20  AM-PAC 6 Clicks Score (OT): 18      Alexander Escobar, PT  11/18/2019

## 2020-08-24 NOTE — DISCHARGE SUMMARY
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 (CMS/HCC) [G30.8, F02.80]  Urinary tract infection without hematuria, site unspecified [N39.0]       Final Discharge Diagnoses:  Active Hospital Problems    Diagnosis   • **Dementia (CMS/HCC)   • Generalized weakness   • Acute UTI (urinary tract infection)       Consults:   Consults     Date and Time Order Name Status Description    11/14/2019 2144 Hospitalist (on-call MD unless specified)            Procedures Performed: None                Pertinent Test Results:   Collected  Updated  Procedure  Result Status      11/14/2019 2106 11/14/2019 2119  Urinalysis With Microscopic If Indicated (No Culture) - Urine, Clean Catch [[id number]]   (Abnormal)   Urine, Clean Catch     Final result  Color, UA Yellow   Appearance, UA Clear   pH, UA 6.5   Specific Gravity, UA 1.026   Glucose Negative   Ketones, UA Negative   Bilirubin, UA Negative   Blood, UA Negative   Protein, UA Negative   Leukocytes, UA Moderate (2+) Abnormal    Nitrite, UA Negative   Urobilinogen, UA 1.0 E.U./dL          11/14/2019 2106 11/14/2019 2119  Urinalysis, Microscopic Only - Urine, Clean Catch [[id number]]   (Abnormal)   Urine, Clean Catch     Final result  RBC, UA 3-5 Abnormal  /HPF   WBC, UA 6-12 Abnormal  /HPF   Bacteria, UA 1+ Abnormal  /HPF   Squamous Epithelial Cells, UA 3-5 Abnormal  /HPF   Hyaline Casts, UA 0-2 /LPF   Methodology: Automated Microscopy           11/14/2019 2106 11/16/2019 0956  Urine Culture - Urine, Urine, Clean Catch [[id number]]   (Abnormal)   Urine, Clean Catch     Final result  Urine Culture >100,000 CFU/mL Corynebacterium species Abnormal              11/14/2019 2000 11/14/2019 2140   As above   [place] Draw [[id number]]    Blood     Final result  No result data          11/14/2019 2000 11/14/2019 2031  Comprehensive Metabolic Panel [[id number]]    (Abnormal)   Blood     Final result  Glucose 91 mg/dL   BUN 19 mg/dL   Creatinine 0.81 mg/dL   Sodium 139 mmol/L   Potassium 4.0 mmol/L   Chloride 100 mmol/L   CO2 27.0 mmol/L   Calcium 9.4 mg/dL   Total Protein 6.8 g/dL   Albumin 4.00 g/dL   ALT (SGPT) 8 U/L   AST (SGOT) 21 U/L   Alkaline Phosphatase 93 U/L   Total Bilirubin <0.2 Abnormally low  mg/dL   eGFR Non African Am 68 mL/min/1.73   Globulin 2.8 gm/dL   A/G Ratio 1.4 g/dL   BUN/Creatinine Ratio 23.5    Anion Gap 12.0 mmol/L          11/14/2019 2000 11/14/2019 2008  CBC Auto Differential [[id number]]   (Abnormal)   Blood     Final result  WBC 9.09 10*3/mm3   RBC 4.29 10*6/mm3   Hemoglobin 12.0 g/dL   Hematocrit 36.6 %   MCV 85.3 fL   MCH 28.0 pg   MCHC 32.8 g/dL   RDW 13.8 %   RDW-SD 43.0 fl   MPV 9.1 fL   Platelets 399 10*3/mm3   Neutrophil Rel % 53.6 %   Lymphocyte Rel % 37.1 %   Monocyte Rel % 7.0 %   Eosinophil Rel % 1.7 %   Basophil Rel % 0.3 %   Immature Granulocyte Rel % 0.3 %   Neutrophils Absolute 4.87 10*3/mm3   Lymphocytes Absolute 3.37 Abnormally high  10*3/mm3   Monocytes Absolute 0.64 10*3/mm3   Eosinophils Absolute 0.15 10*3/mm3   Basophils Absolute 0.03 10*3/mm3   Immature Grans, Absolute 0.03 10*3/mm3   nRBC 0.0 /100 WBC          11/14/2019 2000 11/14/2019 2046  Magnesium [[id number]]   Blood     Final result  Magnesium 2.0 mg/dL          11/14/2019 2000 11/14/2019 2059  TSH [[id number]]   (Abnormal)   Blood     Final result  TSH Baseline 5.660 Abnormally high  uIU/mL          11/14/2019 2000 11/14/2019 2059  T4, Free [[id number]]   Blood     Final result  Free T4 1.04 ng/dL          Collected  Updated  Procedure  Result Status      11/14/2019 2106 11/16/2019 0956  Urine Culture - Urine, Urine, Clean Catch [[id number]]   (Abnormal)   Urine, Clean Catch     Final result  Urine Culture  ">100,000 CFU/mL Corynebacterium species Abnormal       No definitive guidelines. All are susceptible to vancomycin. Resistance to penicillins & cephalosporins does occur.                 Chief Complaint on Day of Discharge: None    Hospital Course:  The patient is a 79 y.o. female female with past medical history of dementia and osteoarthritis who presented on 11/14/19 with complaints of worsening confusion, weakness, anorexia per family report. She was found to have UTI and was admitted for treatment of UTI.  Urine cultures grew corynebacterium species and patient had good response to IV ceftriaxone.  She was discharged to complete a 7-day total antibiotic course patient was reviewed by of p.o. Augmentin.  Physical therapy and occupational therapy while on admission and ambulated quite well with no need for skilled PT OT.  She was deemed to require 24/7 care due to confusion and dementia and family agreed to provide such supervision at home.  Discussions were made with patient's power of  Mr. Erwin Ramos and initially he wanted her placed in a skilled nursing facility.  He eventually agreed to having patient discharged home with her family members who were present in the hospital.  She was discharged in stable condition.        Condition on Discharge: Stable    Physical Exam on Discharge:  /67 (BP Location: Right arm, Patient Position: Lying)   Pulse 60   Temp 97.6 °F (36.4 °C)   Resp 18   Ht 157.5 cm (62\")   Wt 62.1 kg (137 lb)   SpO2 94%   BMI 25.06 kg/m²      Physical Exam  Constitutional: She appears well-developed and well-nourished. No distress.   HENT:   Head: Normocephalic and atraumatic.   Right Ear: External ear normal.   Left Ear: External ear normal.   Nose: Nose normal.   Eyes: Conjunctivae are normal. Right eye exhibits no discharge. Left eye exhibits no discharge.   Neck: Normal range of motion. Neck supple. No JVD present.   Cardiovascular: Normal rate, regular rhythm, normal " heart sounds and intact distal pulses. Exam reveals no gallop and no friction rub.   No murmur heard.  Pulmonary/Chest: Effort normal and breath sounds normal. No stridor. No respiratory distress. She has no wheezes. She has no rales.   Abdominal: Soft. Bowel sounds are normal. She exhibits no distension. There is no tenderness.   Musculoskeletal: Normal range of motion. She exhibits no edema.   Neurological: She is alert.   Oriented to person only   Skin: Skin is warm and dry. She is not diaphoretic.   Psychiatric: She has a normal mood and affect. Her behavior is normal.     Discharge Disposition: Home self-care      Discharge Medications:     Discharge Medications      New Medications      Instructions Start Date   amoxicillin-clavulanate 875-125 MG per tablet  Commonly known as:  AUGMENTIN   1 tablet, Oral, 2 Times Daily         Continue These Medications      Instructions Start Date   donepezil 10 MG tablet  Commonly known as:  ARICEPT   5 mg, Nightly      memantine 10 MG tablet  Commonly known as:  NAMENDA   No dose, route, or frequency recorded.      risperiDONE 0.25 MG tablet  Commonly known as:  risperDAL   0.25 mg, Oral, 2 Times Daily             Discharge Diet: Regular diet    Activity at Discharge: Self-limited activity    Discharge Care Plan/Instructions:   1.  Follow-up with your primary care provider within 1 week of discharge.   2.  Patient to require 24 hours a day supervision at home due to confusion from dementia as discussed with patient's power of .    Follow-up Appointments:   No future appointments.    Test Results Pending at Discharge:     Parminder Dupont MD  11/17/19  3:29 PM    Time: 35 minutes of time was spent evaluating patient and planning discharge